# Patient Record
Sex: FEMALE | Race: WHITE | NOT HISPANIC OR LATINO | Employment: OTHER
[De-identification: names, ages, dates, MRNs, and addresses within clinical notes are randomized per-mention and may not be internally consistent; named-entity substitution may affect disease eponyms.]

---

## 2018-07-24 ENCOUNTER — TRANSCRIBE ORDERS (OUTPATIENT)
Dept: SCHEDULING | Age: 63
End: 2018-07-24

## 2018-07-24 DIAGNOSIS — Z12.31 ENCOUNTER FOR SCREENING MAMMOGRAM FOR MALIGNANT NEOPLASM OF BREAST: Primary | ICD-10-CM

## 2018-07-26 ENCOUNTER — HOSPITAL ENCOUNTER (OUTPATIENT)
Dept: RADIOLOGY | Age: 63
Discharge: HOME | End: 2018-07-26
Attending: OBSTETRICS & GYNECOLOGY
Payer: COMMERCIAL

## 2018-07-26 DIAGNOSIS — Z12.31 ENCOUNTER FOR SCREENING MAMMOGRAM FOR MALIGNANT NEOPLASM OF BREAST: ICD-10-CM

## 2018-07-26 PROCEDURE — 77063 BREAST TOMOSYNTHESIS BI: CPT

## 2019-03-27 ENCOUNTER — TRANSCRIBE ORDERS (OUTPATIENT)
Dept: SCHEDULING | Age: 64
End: 2019-03-27

## 2019-03-27 DIAGNOSIS — I42.9 CARDIOMYOPATHY (CMS/HCC): ICD-10-CM

## 2019-03-27 DIAGNOSIS — R94.31 ABNORMAL ELECTROCARDIOGRAM: Primary | ICD-10-CM

## 2019-04-02 ENCOUNTER — HOSPITAL ENCOUNTER (OUTPATIENT)
Dept: CARDIOLOGY | Facility: CLINIC | Age: 64
Discharge: HOME | End: 2019-04-02
Attending: INTERNAL MEDICINE
Payer: COMMERCIAL

## 2019-04-02 VITALS — HEIGHT: 64 IN | BODY MASS INDEX: 28.85 KG/M2 | WEIGHT: 169 LBS

## 2019-04-02 DIAGNOSIS — R94.31 ABNORMAL ELECTROCARDIOGRAM: ICD-10-CM

## 2019-04-02 DIAGNOSIS — I42.9 CARDIOMYOPATHY (CMS/HCC): ICD-10-CM

## 2019-04-02 LAB
AORTIC ROOT ANNULUS: 2.7 CM
BSA FOR ECHO PROCEDURE: 1.86 M2
E WAVE DECELERATION TIME: 218 MS
E/A RATIO: 1.2
E/E' RATIO: 11.4
E/LAT E' RATIO: 10.6
EST RIGHT VENT SYSTOLIC PRESSURE BY TRICUSPID REGURGITATION JET: 18 MMHG
FRACTIONAL SHORTENING: 35.9 %
INTERVENTRICULAR SEPTUM: 0.8 CM
LA/AORTA RATIO: 1.48
LAD 2D: 4 CM
LEFT INTERNAL DIMENSION IN SYSTOLE: 2.5 CM (ref 2.53–3.83)
LEFT VENTRICULAR INTERNAL DIMENSION IN DIASTOLE: 3.9 CM (ref 4.28–5.94)
LEFT VENTRICULAR POSTERIOR WALL IN END DIASTOLE: 0.9 CM (ref 0.56–1.05)
MV E'TISSUE VEL-LAT: 0.09 M/S
MV E'TISSUE VEL-MED: 0.09 M/S
MV PEAK A VEL: 0.83 M/S
MV PEAK E VEL: 0.97 M/S
POSTERIOR WALL: 0.9 CM
TR MAX PG: 13 MMHG
TRICUSPID VALVE PEAK REGURGITATION VELOCITY: 1.82 M/S
Z-SCORE OF LEFT VENTRICULAR DIMENSION IN END DIASTOLE: -2.57
Z-SCORE OF LEFT VENTRICULAR DIMENSION IN END SYSTOLE: -1.74
Z-SCORE OF LEFT VENTRICULAR POSTERIOR WALL IN END DIASTOLE: 0.85

## 2019-04-02 PROCEDURE — 93306 TTE W/DOPPLER COMPLETE: CPT

## 2019-05-10 ENCOUNTER — TRANSCRIBE ORDERS (OUTPATIENT)
Dept: SCHEDULING | Age: 64
End: 2019-05-10

## 2019-05-10 DIAGNOSIS — Z82.49 FAMILY HISTORY OF ISCHEMIC HEART DISEASE AND OTHER DISEASES OF THE CIRCULATORY SYSTEM: Primary | ICD-10-CM

## 2019-05-16 ENCOUNTER — HOSPITAL ENCOUNTER (OUTPATIENT)
Dept: RADIOLOGY | Facility: HOSPITAL | Age: 64
Discharge: HOME | End: 2019-05-16
Attending: INTERNAL MEDICINE

## 2019-05-16 DIAGNOSIS — Z82.49 FAMILY HISTORY OF ISCHEMIC HEART DISEASE AND OTHER DISEASES OF THE CIRCULATORY SYSTEM: ICD-10-CM

## 2019-05-16 PROCEDURE — 75571 CT HRT W/O DYE W/CA TEST: CPT

## 2019-09-27 ENCOUNTER — TRANSCRIBE ORDERS (OUTPATIENT)
Dept: SCHEDULING | Age: 64
End: 2019-09-27

## 2019-09-27 DIAGNOSIS — Z12.31 ENCOUNTER FOR SCREENING MAMMOGRAM FOR MALIGNANT NEOPLASM OF BREAST: Primary | ICD-10-CM

## 2019-10-02 ENCOUNTER — HOSPITAL ENCOUNTER (OUTPATIENT)
Dept: RADIOLOGY | Age: 64
Discharge: HOME | End: 2019-10-02
Attending: OBSTETRICS & GYNECOLOGY
Payer: COMMERCIAL

## 2019-10-02 DIAGNOSIS — Z12.31 ENCOUNTER FOR SCREENING MAMMOGRAM FOR MALIGNANT NEOPLASM OF BREAST: ICD-10-CM

## 2019-10-02 PROCEDURE — 77067 SCR MAMMO BI INCL CAD: CPT

## 2019-10-14 PROCEDURE — 88305 TISSUE EXAM BY PATHOLOGIST: CPT | Performed by: DERMATOLOGY

## 2019-10-18 ENCOUNTER — LAB REQUISITION (OUTPATIENT)
Dept: LAB | Facility: HOSPITAL | Age: 64
End: 2019-10-18
Attending: DERMATOLOGY
Payer: COMMERCIAL

## 2019-10-18 DIAGNOSIS — D48.5 NEOPLASM OF UNCERTAIN BEHAVIOR OF SKIN: ICD-10-CM

## 2019-10-21 LAB
CASE RPRT: NORMAL
CLINICAL INFO: NORMAL
PATH REPORT.FINAL DX SPEC: NORMAL
PATH REPORT.GROSS SPEC: NORMAL
PATH REPORT.MICROSCOPIC SPEC OTHER STN: NORMAL

## 2020-11-03 ENCOUNTER — TRANSCRIBE ORDERS (OUTPATIENT)
Dept: SCHEDULING | Age: 65
End: 2020-11-03

## 2020-11-03 DIAGNOSIS — Z12.31 ENCOUNTER FOR SCREENING MAMMOGRAM FOR MALIGNANT NEOPLASM OF BREAST: Primary | ICD-10-CM

## 2020-11-30 ENCOUNTER — HOSPITAL ENCOUNTER (OUTPATIENT)
Dept: RADIOLOGY | Age: 65
Discharge: HOME | End: 2020-11-30
Attending: INTERNAL MEDICINE
Payer: COMMERCIAL

## 2020-11-30 DIAGNOSIS — Z12.31 ENCOUNTER FOR SCREENING MAMMOGRAM FOR MALIGNANT NEOPLASM OF BREAST: ICD-10-CM

## 2020-11-30 PROCEDURE — 77067 SCR MAMMO BI INCL CAD: CPT

## 2021-04-14 DIAGNOSIS — Z23 ENCOUNTER FOR IMMUNIZATION: ICD-10-CM

## 2021-10-26 ENCOUNTER — TRANSCRIBE ORDERS (OUTPATIENT)
Dept: SCHEDULING | Age: 66
End: 2021-10-26
Payer: COMMERCIAL

## 2021-10-26 DIAGNOSIS — Z78.0 ASYMPTOMATIC MENOPAUSAL STATE: ICD-10-CM

## 2021-10-26 DIAGNOSIS — M85.50 ANEURYSMAL BONE CYST, UNSPECIFIED SITE: ICD-10-CM

## 2021-10-26 DIAGNOSIS — Z12.31 ENCOUNTER FOR SCREENING MAMMOGRAM FOR MALIGNANT NEOPLASM OF BREAST: Primary | ICD-10-CM

## 2022-01-14 ENCOUNTER — HOSPITAL ENCOUNTER (OUTPATIENT)
Dept: RADIOLOGY | Age: 67
Discharge: HOME | End: 2022-01-14
Attending: OBSTETRICS & GYNECOLOGY
Payer: COMMERCIAL

## 2022-01-14 DIAGNOSIS — Z78.0 ASYMPTOMATIC MENOPAUSAL STATE: ICD-10-CM

## 2022-01-14 DIAGNOSIS — Z12.31 ENCOUNTER FOR SCREENING MAMMOGRAM FOR MALIGNANT NEOPLASM OF BREAST: ICD-10-CM

## 2022-01-14 DIAGNOSIS — M85.50 ANEURYSMAL BONE CYST, UNSPECIFIED SITE: ICD-10-CM

## 2022-01-14 PROCEDURE — 77080 DXA BONE DENSITY AXIAL: CPT

## 2022-01-14 PROCEDURE — 77063 BREAST TOMOSYNTHESIS BI: CPT

## 2022-03-03 ENCOUNTER — TELEPHONE (OUTPATIENT)
Dept: PULMONOLOGY | Facility: HOSPITAL | Age: 67
End: 2022-03-03
Payer: COMMERCIAL

## 2022-03-03 ENCOUNTER — TRANSCRIBE ORDERS (OUTPATIENT)
Dept: SCHEDULING | Age: 67
End: 2022-03-03

## 2022-03-03 VITALS — HEIGHT: 62 IN | BODY MASS INDEX: 31.1 KG/M2 | WEIGHT: 169 LBS

## 2022-03-03 DIAGNOSIS — Z87.891 PERSONAL HISTORY OF TOBACCO USE, PRESENTING HAZARDS TO HEALTH: Primary | ICD-10-CM

## 2022-03-03 DIAGNOSIS — Z12.2 SCREENING FOR MALIGNANT NEOPLASM OF RESPIRATORY ORGAN: ICD-10-CM

## 2022-03-03 DIAGNOSIS — M41.9 SCOLIOSIS, UNSPECIFIED: Primary | ICD-10-CM

## 2022-03-03 NOTE — TELEPHONE ENCOUNTER
Rachael called to schedule her baseline lung screening.  She was interviewed and sent to scheduling.

## 2022-03-10 ENCOUNTER — HOSPITAL ENCOUNTER (OUTPATIENT)
Dept: RADIOLOGY | Age: 67
Discharge: HOME | End: 2022-03-10
Attending: INTERNAL MEDICINE
Payer: COMMERCIAL

## 2022-03-10 DIAGNOSIS — M41.9 SCOLIOSIS, UNSPECIFIED: ICD-10-CM

## 2022-03-11 ENCOUNTER — HOSPITAL ENCOUNTER (OUTPATIENT)
Dept: RADIOLOGY | Age: 67
Discharge: HOME | End: 2022-03-11
Attending: INTERNAL MEDICINE
Payer: COMMERCIAL

## 2022-03-11 DIAGNOSIS — Z12.2 SCREENING FOR MALIGNANT NEOPLASM OF RESPIRATORY ORGAN: ICD-10-CM

## 2022-03-11 DIAGNOSIS — Z87.891 PERSONAL HISTORY OF TOBACCO USE, PRESENTING HAZARDS TO HEALTH: ICD-10-CM

## 2022-03-11 PROCEDURE — G1004 CDSM NDSC: HCPCS

## 2023-02-10 ENCOUNTER — HOSPITAL ENCOUNTER (OUTPATIENT)
Dept: RADIOLOGY | Age: 68
Discharge: HOME | End: 2023-02-10
Attending: INTERNAL MEDICINE
Payer: COMMERCIAL

## 2023-02-10 ENCOUNTER — TRANSCRIBE ORDERS (OUTPATIENT)
Dept: RADIOLOGY | Age: 68
End: 2023-02-10

## 2023-02-10 DIAGNOSIS — Z12.31 ENCOUNTER FOR SCREENING MAMMOGRAM FOR MALIGNANT NEOPLASM OF BREAST: Primary | ICD-10-CM

## 2023-02-10 DIAGNOSIS — Z12.31 ENCOUNTER FOR SCREENING MAMMOGRAM FOR MALIGNANT NEOPLASM OF BREAST: ICD-10-CM

## 2023-02-10 PROCEDURE — 77063 BREAST TOMOSYNTHESIS BI: CPT

## 2023-05-11 ENCOUNTER — TRANSCRIBE ORDERS (OUTPATIENT)
Dept: SCHEDULING | Age: 68
End: 2023-05-11

## 2023-05-11 ENCOUNTER — TELEPHONE (OUTPATIENT)
Dept: PULMONOLOGY | Facility: HOSPITAL | Age: 68
End: 2023-05-11
Payer: COMMERCIAL

## 2023-05-11 VITALS — BODY MASS INDEX: 31.28 KG/M2 | HEIGHT: 62 IN | WEIGHT: 170 LBS

## 2023-05-11 DIAGNOSIS — Z87.891 FORMER SMOKER: ICD-10-CM

## 2023-05-11 DIAGNOSIS — R91.1 COIN LESION: ICD-10-CM

## 2023-05-11 DIAGNOSIS — Z12.2 SCREENING FOR MALIGNANT NEOPLASM OF RESPIRATORY ORGAN: Primary | ICD-10-CM

## 2023-05-20 ENCOUNTER — HOSPITAL ENCOUNTER (OUTPATIENT)
Facility: HOSPITAL | Age: 68
Setting detail: OBSERVATION
Discharge: HOME | DRG: 603 | End: 2023-05-22
Attending: EMERGENCY MEDICINE | Admitting: HOSPITALIST
Payer: COMMERCIAL

## 2023-05-20 ENCOUNTER — APPOINTMENT (EMERGENCY)
Dept: RADIOLOGY | Facility: HOSPITAL | Age: 68
DRG: 603 | End: 2023-05-20
Payer: COMMERCIAL
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
## 2023-05-20 DIAGNOSIS — R50.9 FEVER, UNSPECIFIED FEVER CAUSE: Primary | ICD-10-CM

## 2023-05-20 DIAGNOSIS — L03.119 CELLULITIS OF HAND: ICD-10-CM

## 2023-05-20 PROBLEM — F41.9 ANXIETY AND DEPRESSION: Status: ACTIVE | Noted: 2023-05-20

## 2023-05-20 PROBLEM — S61.459A: Status: ACTIVE | Noted: 2023-05-20

## 2023-05-20 PROBLEM — F32.A ANXIETY AND DEPRESSION: Status: ACTIVE | Noted: 2023-05-20

## 2023-05-20 LAB
ALBUMIN SERPL-MCNC: 3.9 G/DL (ref 3.4–5)
ALP SERPL-CCNC: 108 IU/L (ref 35–126)
ALT SERPL-CCNC: 20 IU/L (ref 11–54)
ANION GAP SERPL CALC-SCNC: 10 MEQ/L (ref 3–15)
AST SERPL-CCNC: 36 IU/L (ref 15–41)
BASOPHILS # BLD: 0.05 K/UL (ref 0.01–0.1)
BASOPHILS NFR BLD: 0.6 %
BILIRUB SERPL-MCNC: 1 MG/DL (ref 0.3–1.2)
BUN SERPL-MCNC: 23 MG/DL (ref 8–20)
CALCIUM SERPL-MCNC: 8.6 MG/DL (ref 8.9–10.3)
CHLORIDE SERPL-SCNC: 103 MEQ/L (ref 98–109)
CO2 SERPL-SCNC: 22 MEQ/L (ref 22–32)
CREAT SERPL-MCNC: 1.1 MG/DL (ref 0.6–1.1)
CRP SERPL-MCNC: <6 MG/L
DIFFERENTIAL METHOD BLD: ABNORMAL
EOSINOPHIL # BLD: 0.02 K/UL (ref 0.04–0.36)
EOSINOPHIL NFR BLD: 0.2 %
ERYTHROCYTE [DISTWIDTH] IN BLOOD BY AUTOMATED COUNT: 13.9 % (ref 11.7–14.4)
FLUAV RNA SPEC QL NAA+PROBE: NEGATIVE
FLUBV RNA SPEC QL NAA+PROBE: NEGATIVE
GFR SERPL CREATININE-BSD FRML MDRD: 49.5 ML/MIN/1.73M*2
GLUCOSE SERPL-MCNC: 179 MG/DL (ref 70–99)
HCT VFR BLDCO AUTO: 35.8 % (ref 35–45)
HGB BLD-MCNC: 12 G/DL (ref 11.8–15.7)
IMM GRANULOCYTES # BLD AUTO: 0.03 K/UL (ref 0–0.08)
IMM GRANULOCYTES NFR BLD AUTO: 0.4 %
LACTATE SERPL-SCNC: 1.9 MMOL/L (ref 0.4–2)
LYMPHOCYTES # BLD: 0.5 K/UL (ref 1.2–3.5)
LYMPHOCYTES NFR BLD: 6.1 %
MCH RBC QN AUTO: 30.6 PG (ref 28–33.2)
MCHC RBC AUTO-ENTMCNC: 33.5 G/DL (ref 32.2–35.5)
MCV RBC AUTO: 91.3 FL (ref 83–98)
MONOCYTES # BLD: 0.73 K/UL (ref 0.28–0.8)
MONOCYTES NFR BLD: 9 %
NEUTROPHILS # BLD: 6.82 K/UL (ref 1.7–7)
NEUTS SEG NFR BLD: 83.7 %
NRBC BLD-RTO: 0 %
PDW BLD AUTO: 12.3 FL (ref 9.4–12.3)
PLATELET # BLD AUTO: 171 K/UL (ref 150–369)
POTASSIUM SERPL-SCNC: 5 MEQ/L (ref 3.6–5.1)
PROT SERPL-MCNC: 7.1 G/DL (ref 6–8.2)
RBC # BLD AUTO: 3.92 M/UL (ref 3.93–5.22)
RSV RNA SPEC QL NAA+PROBE: NEGATIVE
SARS-COV-2 RNA RESP QL NAA+PROBE: NEGATIVE
SODIUM SERPL-SCNC: 135 MEQ/L (ref 136–144)
WBC # BLD AUTO: 8.15 K/UL (ref 3.8–10.5)

## 2023-05-20 PROCEDURE — 63700000 HC SELF-ADMINISTRABLE DRUG

## 2023-05-20 PROCEDURE — 25800000 HC PHARMACY IV SOLUTIONS: Performed by: PHYSICIAN ASSISTANT

## 2023-05-20 PROCEDURE — 80053 COMPREHEN METABOLIC PANEL: CPT | Performed by: EMERGENCY MEDICINE

## 2023-05-20 PROCEDURE — 86140 C-REACTIVE PROTEIN: CPT | Performed by: PHYSICIAN ASSISTANT

## 2023-05-20 PROCEDURE — 96365 THER/PROPH/DIAG IV INF INIT: CPT

## 2023-05-20 PROCEDURE — G0378 HOSPITAL OBSERVATION PER HR: HCPCS

## 2023-05-20 PROCEDURE — 96361 HYDRATE IV INFUSION ADD-ON: CPT

## 2023-05-20 PROCEDURE — 83605 ASSAY OF LACTIC ACID: CPT | Performed by: PHYSICIAN ASSISTANT

## 2023-05-20 PROCEDURE — 87637 SARSCOV2&INF A&B&RSV AMP PRB: CPT | Performed by: PHYSICIAN ASSISTANT

## 2023-05-20 PROCEDURE — 36415 COLL VENOUS BLD VENIPUNCTURE: CPT | Performed by: EMERGENCY MEDICINE

## 2023-05-20 PROCEDURE — 71046 X-RAY EXAM CHEST 2 VIEWS: CPT

## 2023-05-20 PROCEDURE — 93005 ELECTROCARDIOGRAM TRACING: CPT | Performed by: HOSPITALIST

## 2023-05-20 PROCEDURE — 71045 X-RAY EXAM CHEST 1 VIEW: CPT

## 2023-05-20 PROCEDURE — 73130 X-RAY EXAM OF HAND: CPT | Mod: 50

## 2023-05-20 PROCEDURE — 99223 1ST HOSP IP/OBS HIGH 75: CPT | Performed by: HOSPITALIST

## 2023-05-20 PROCEDURE — 63600000 HC DRUGS/DETAIL CODE: Performed by: PHYSICIAN ASSISTANT

## 2023-05-20 PROCEDURE — 87040 BLOOD CULTURE FOR BACTERIA: CPT | Performed by: PHYSICIAN ASSISTANT

## 2023-05-20 PROCEDURE — 63700000 HC SELF-ADMINISTRABLE DRUG: Performed by: STUDENT IN AN ORGANIZED HEALTH CARE EDUCATION/TRAINING PROGRAM

## 2023-05-20 PROCEDURE — 63700000 HC SELF-ADMINISTRABLE DRUG: Performed by: HOSPITALIST

## 2023-05-20 PROCEDURE — 99285 EMERGENCY DEPT VISIT HI MDM: CPT | Mod: 25

## 2023-05-20 PROCEDURE — 85025 COMPLETE CBC W/AUTO DIFF WBC: CPT | Performed by: EMERGENCY MEDICINE

## 2023-05-20 PROCEDURE — 63600000 HC DRUGS/DETAIL CODE: Performed by: HOSPITALIST

## 2023-05-20 RX ORDER — KETOROLAC TROMETHAMINE 15 MG/ML
15 INJECTION, SOLUTION INTRAMUSCULAR; INTRAVENOUS EVERY 6 HOURS PRN
Status: DISCONTINUED | OUTPATIENT
Start: 2023-05-20 | End: 2023-05-22 | Stop reason: HOSPADM

## 2023-05-20 RX ORDER — LORAZEPAM 1 MG/1
2 TABLET ORAL NIGHTLY
Status: DISCONTINUED | OUTPATIENT
Start: 2023-05-20 | End: 2023-05-22 | Stop reason: HOSPADM

## 2023-05-20 RX ORDER — DEXTROSE 50 % IN WATER (D50W) INTRAVENOUS SYRINGE
25 AS NEEDED
Status: DISCONTINUED | OUTPATIENT
Start: 2023-05-20 | End: 2023-05-22 | Stop reason: HOSPADM

## 2023-05-20 RX ORDER — DEXTROSE 40 %
15-30 GEL (GRAM) ORAL AS NEEDED
Status: DISCONTINUED | OUTPATIENT
Start: 2023-05-20 | End: 2023-05-22 | Stop reason: HOSPADM

## 2023-05-20 RX ORDER — LORAZEPAM 1 MG/1
1 TABLET ORAL 3 TIMES DAILY PRN
Status: DISCONTINUED | OUTPATIENT
Start: 2023-05-20 | End: 2023-05-21

## 2023-05-20 RX ORDER — VIT C/E/ZN/COPPR/LUTEIN/ZEAXAN 250MG-90MG
1000 CAPSULE ORAL DAILY
COMMUNITY

## 2023-05-20 RX ORDER — ACETAMINOPHEN 325 MG/1
650 TABLET ORAL EVERY 4 HOURS PRN
Status: DISCONTINUED | OUTPATIENT
Start: 2023-05-20 | End: 2023-05-22 | Stop reason: HOSPADM

## 2023-05-20 RX ORDER — CHOLECALCIFEROL (VITAMIN D3) 25 MCG
1000 TABLET ORAL DAILY
Status: DISCONTINUED | OUTPATIENT
Start: 2023-05-21 | End: 2023-05-22 | Stop reason: HOSPADM

## 2023-05-20 RX ORDER — IBUPROFEN 200 MG
16-32 TABLET ORAL AS NEEDED
Status: DISCONTINUED | OUTPATIENT
Start: 2023-05-20 | End: 2023-05-22 | Stop reason: HOSPADM

## 2023-05-20 RX ORDER — ENOXAPARIN SODIUM 100 MG/ML
40 INJECTION SUBCUTANEOUS
Status: DISCONTINUED | OUTPATIENT
Start: 2023-05-21 | End: 2023-05-22 | Stop reason: HOSPADM

## 2023-05-20 RX ORDER — FLUOXETINE HYDROCHLORIDE 20 MG/1
20 CAPSULE ORAL 3 TIMES DAILY
COMMUNITY
Start: 2023-03-01

## 2023-05-20 RX ORDER — ACETAMINOPHEN 325 MG/1
975 TABLET ORAL ONCE
Status: COMPLETED | OUTPATIENT
Start: 2023-05-20 | End: 2023-05-20

## 2023-05-20 RX ORDER — FLUOXETINE HYDROCHLORIDE 20 MG/1
20 CAPSULE ORAL 3 TIMES DAILY
Status: DISCONTINUED | OUTPATIENT
Start: 2023-05-20 | End: 2023-05-21

## 2023-05-20 RX ORDER — LORAZEPAM 1 MG/1
1 TABLET ORAL 3 TIMES DAILY PRN
COMMUNITY
Start: 2023-01-03

## 2023-05-20 RX ADMIN — AMPICILLIN SODIUM AND SULBACTAM SODIUM 3 G: 2; 1 INJECTION, POWDER, FOR SOLUTION INTRAMUSCULAR; INTRAVENOUS at 18:29

## 2023-05-20 RX ADMIN — FLUOXETINE 20 MG: 20 CAPSULE ORAL at 21:37

## 2023-05-20 RX ADMIN — KETOROLAC TROMETHAMINE 15 MG: 15 INJECTION, SOLUTION INTRAMUSCULAR; INTRAVENOUS at 21:13

## 2023-05-20 RX ADMIN — LORAZEPAM 2 MG: 1 TABLET ORAL at 21:37

## 2023-05-20 RX ADMIN — SODIUM CHLORIDE 1000 ML: 9 INJECTION, SOLUTION INTRAVENOUS at 18:29

## 2023-05-20 RX ADMIN — ACETAMINOPHEN 975 MG: 325 TABLET ORAL at 17:39

## 2023-05-20 ASSESSMENT — ENCOUNTER SYMPTOMS
SHORTNESS OF BREATH: 0
FEVER: 1
VOMITING: 0
DYSURIA: 0
COUGH: 1
ABDOMINAL PAIN: 0
FATIGUE: 1
CHILLS: 1
DIARRHEA: 0

## 2023-05-20 NOTE — ED ATTESTATION NOTE
"I have personally seen and examined the patient.  I personally performed the key components of the encounter and provided a substantive portion of the care and medical decision making for this patient.  I reviewed and agree with physician assistant / nurse practitioners assessment and plan of care, with the following exceptions: None  My examination, assessment, and plan of care of Rachael Louis is as follows:     Exam: Well-appearing, no acute distress, wake alert oriented x3, right hand with puncture wounds to the  thenar eminence and webspace between first and second digit, surrounding redness, warmth and swelling, good range of motion of fingers without restriction, no crepitus, good cap refill, normal pulses, left hand with puncture wounds to the left thenar eminence and forearm, mild redness and warmth to the hand, none forearm, no crepitus, normal pulses, good cap refill    Assessment and plan: Patient was bitten by her dog this morning and at 1 PM started with fevers and chills worsening pain and redness to her hands, patient had similar presentation about 10 years ago after dog bite in which she had rapidly progressing infection due to Pasteurella and required hospitalization and needed an I&D, no infectious symptoms leading up to this, just \"allergy\" symptoms she is nontoxic in appearance, lab work reassuring, no signs of necrotizing fasciitis, given IV fluids, IV antibiotics, will hospitalize for further evaluation and treatment    Differential diagnoses considered but not limited to, include: Cellulitis, bacteremia, necrotizing fasciitis, viral syndrome, sepsis         Linus Edward,   05/20/23 1951    "

## 2023-05-20 NOTE — ED PROVIDER NOTES
Emergency Medicine Note  HPI   HISTORY OF PRESENT ILLNESS     66 y/o F with PMH depression, seasonal allergies presents today for evaluation. Pt states at 9 am was attempting to feed her dog chicken and bite patient to bilateral hands resulting in multiple lacerations. 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 At 1 pm started with subjective fevers and chills. Pt checked temperature and found to have fever. Pt states has h/o prior animal bite to finger with similar reaction of fever a few hours after injury.  Pt was admitted for a week and required OR for wash out. Pt also admits to mild cough and congestion typical of her seasonal allergies. Denies vomiting, diarrhea or urinary symptoms.      History provided by:  Patient  Animal Bite  Associated symptoms: fever          Patient History   PAST HISTORY     Reviewed from Nursing Triage:       Past Medical History:   Diagnosis Date    Depression     Seasonal allergies        Past Surgical History:   Procedure Laterality Date    NEPHRECTOMY         Family History   Problem Relation Age of Onset    Lung cancer Neg Hx        Social History     Tobacco Use    Smoking status: Former     Packs/day: 0.75     Years: 36.00     Pack years: 27.00     Types: Cigarettes     Start date:      Quit date:      Years since quittin.3     Passive exposure: Past    Smokeless tobacco: Never    Tobacco comments:     quit for about 5 yrs during span   Substance Use Topics    Alcohol use: Not Currently    Drug use: Never         Review of Systems   REVIEW OF SYSTEMS     Review of Systems   Constitutional: Positive for chills, fatigue and fever.   HENT: Positive for congestion.    Respiratory: Positive for cough (dry). Negative for shortness of breath.    Cardiovascular: Negative for chest pain.   Gastrointestinal: Negative for abdominal pain, diarrhea and vomiting.   Genitourinary: Negative for dysuria.   Neurological: Negative for syncope.         VITALS     ED  Vitals    Date/Time Temp Pulse Resp BP SpO2 [facility]   05/20/23 1926 38.4 °C (101.2 °F) 81 16 116/59 98 % MG   05/20/23 1732 38.4 °C (101.2 °F) 90 18 139/64 97 % TS        Pulse Ox %: 97 % (05/20/23 1752)  Pulse Ox Interpretation: Normal (05/20/23 1752)           Physical Exam   PHYSICAL EXAM     Physical Exam  Vitals and nursing note reviewed.   Constitutional:       Appearance: Normal appearance.   HENT:      Head: Normocephalic.   Cardiovascular:      Rate and Rhythm: Normal rate and regular rhythm.   Pulmonary:      Effort: Pulmonary effort is normal.      Breath sounds: Normal breath sounds.   Abdominal:      Palpations: Abdomen is soft.      Tenderness: There is no abdominal tenderness.   Musculoskeletal:      Cervical back: Normal range of motion and neck supple.      Comments: Pt with multiple puncture wounds to bilateral dorsal hands. 2 puncture wounds slightly deeper to L crease of 1st and 2nd digits. Bleeding controlled. Mild surrounding erythema, edema and warmth to puncture wounds without fluctuance, crepitus or drainage. FROM digits, hand, wrist. 2+ radial pulse    Skin:     General: Skin is warm and dry.   Neurological:      General: No focal deficit present.      Mental Status: She is alert and oriented to person, place, and time.           PROCEDURES     Procedures     DATA     Results     Procedure Component Value Units Date/Time    CBC and differential [[id number]]  (Abnormal) Collected: 05/20/23 1844    Specimen: Blood, Venous Updated: 05/20/23 1851     WBC 8.15 K/uL      RBC 3.92 M/uL      Hemoglobin 12.0 g/dL      Hematocrit 35.8 %      MCV 91.3 fL      MCH 30.6 pg      MCHC 33.5 g/dL      RDW 13.9 %      Platelets 171 K/uL      MPV 12.3 fL      Differential Type Auto     nRBC 0.0 %      Immature Granulocytes 0.4 %      Neutrophils 83.7 %      Lymphocytes 6.1 %      Monocytes 9.0 %      Eosinophils 0.2 %      Basophils 0.6 %      Immature Granulocytes, Absolute 0.03 K/uL      Neutrophils, Absolute  6.82 K/uL      Lymphocytes, Absolute 0.50 K/uL      Monocytes, Absolute 0.73 K/uL      Eosinophils, Absolute 0.02 K/uL      Basophils, Absolute 0.05 K/uL     SARS-CoV-2 (COVID-19), PCR Nasopharynx [[id number]]  (Normal) Collected: 05/20/23 1758    Specimen: Nasopharyngeal Swab from Nasopharynx Updated: 05/20/23 1840    Narrative:      The following orders were created for panel order SARS-CoV-2 (COVID-19), PCR Nasopharynx.  Procedure                               Abnormality         Status                     ---------                               -----------         ------                     SARS-COV-2 (COVID-19)/ F...[[id number]]  Normal              Final result                 Please view results for these tests on the individual orders.    SARS-COV-2 (COVID-19)/ FLU A/B, AND RSV, PCR Nasopharynx [[id number]]  (Normal) Collected: 05/20/23 1758    Specimen: Nasopharyngeal Swab from Nasopharynx Updated: 05/20/23 1840     SARS-CoV-2 (COVID-19) Negative     Influenza A Negative     Influenza B Negative     Respiratory Syncytial Virus Negative    Narrative:      Testing performed using real-time PCR for detection of COVID-19. EUA approved validation studies performed on site.     C-reactive protein [[id number]]  (Normal) Collected: 05/20/23 1745    Specimen: Blood, Venous Updated: 05/20/23 1827     CRP <6.00 mg/L     Comprehensive metabolic panel [[id number]]  (Abnormal) Collected: 05/20/23 1745    Specimen: Blood, Venous Updated: 05/20/23 1827     Sodium 135 mEQ/L      Potassium 5.0 mEQ/L      Comment: Results obtained on plasma. Plasma Potassium values may be up to 0.4 mEQ/L less than serum values. The differences may be greater for patients with high platelet or white cell counts.  MODERATE HEMOLYSIS, RESULT MAY BE INCREASED.        Chloride 103 mEQ/L      CO2 22 mEQ/L      BUN 23 mg/dL      Creatinine 1.1 mg/dL      Glucose 179 mg/dL      Calcium 8.6 mg/dL      AST (SGOT) 36 IU/L      Comment: MODERATE HEMOLYSIS,  RESULT MAY BE INCREASED.        ALT (SGPT) 20 IU/L      Comment: MODERATE HEMOLYSIS, RESULT MAY BE INCREASED.        Alkaline Phosphatase 108 IU/L      Total Protein 7.1 g/dL      Comment: Test performed on plasma which typically contains approximately 0.4 g/dL more protein than serum.        Albumin 3.9 g/dL      Bilirubin, Total 1.0 mg/dL      Comment: MODERATE HEMOLYSIS, RESULT MAY BE INCREASED.        eGFR 49.5 mL/min/1.73m*2      Anion Gap 10 mEQ/L     Lactate, w/ reflex repeat if > 2.0 [[id number]]  (Normal) Collected: 05/20/23 1752    Specimen: Blood, Venous Updated: 05/20/23 1801     Lactate 1.9 mmol/L     [place] Draw Panel [[id number]] Collected: 05/20/23 1754    Specimen: Blood, Venous Updated: 05/20/23 1758    Narrative:      The following orders were created for panel order [place] Draw Panel.  Procedure                               Abnormality         Status                     ---------                               -----------         ------                     [place] Blood Culture[[id number]]                            In process                   Please view results for these tests on the individual orders.    [place] Blood Culture [[id number]] Collected: 05/20/23 1754    Specimen: Blood, Venous Updated: 05/20/23 1758    [place] Draw Panel [[id number]] Collected: 05/20/23 1745    Specimen: Blood, Venous Updated: 05/20/23 1751    Narrative:      The following orders were created for panel order [place] Draw Panel.  Procedure                               Abnormality         Status                     ---------                               -----------         ------                     [place] Blood Culture[[id number]]                            In process                   Please view results for these tests on the individual orders.    [place] Blood Culture [[id number]] Collected: 05/20/23 1745    Specimen: Blood, Venous Updated: 05/20/23 1751          Imaging Results          X-RAY HAND BILATERAL 3+VW  (Preliminary result)  Result time 05/20/23 19:13:11    Preliminary Interpretation    NAD                             X-RAY CHEST 1 VIEW (Preliminary result)  Result time 05/20/23 19:13:15   Procedure changed from X-RAY CHEST 2 VIEWS     Preliminary Interpretation    NAD                              No orders to display       Scoring tools                                  ED Course & MDM   MDM / ED COURSE / CLINICAL IMPRESSION / DISPO     MDM    ED Course as of 05/20/23 2008   Sat May 20, 2023   1804 Pt presents today for evaluation of fever in setting of dog bite to b/l hands earlier today. Pt febrile to 101.2 F but non toxic appearing. Pt with evidence of mild cellulitis to b/l hands. IV Unasyn ordered. Labs, x-ray pending. Will add COVID swab, UA and CXR to r/u other potential etiology of fever  [NH]   1913 Labs reassuring  COVID negative  CXR clear  X-ray hands unremarkable  Given how rapidly fever developed after dog bite with history,  plan for admission for IV antibiotics  Pt agrees with plan  Paged HMS [NH]      ED Course User Index  [NH] Astrid Joshi PA C     Clinical Impression      Fever, unspecified fever cause   Dog bite, initial encounter   Cellulitis of hand     _________________     ED Disposition   Admit / Observation                   Astrid Joshi PA C  05/20/23 2008

## 2023-05-20 NOTE — H&P
"   Hospital Medicine Service -  History & Physical        CHIEF COMPLAINT   \"Dog bite\"     HISTORY OF PRESENT ILLNESS      Rachael Louis is a 67 y.o. female with a past medical history of Depression, Seasonal Allergies ad prior history of dog bite with abscess and cellulitis due to Pasteurella [place] presents with fever, chills, nausea and generalized malaise that started around 1 pm today. Patient reports that her dog has not been feeling well and this morning she was helping him eat a chicken bone when he suddenly became upset and started to \"bite at her\". She states that he initially bit her left hand and when she tried to use her right hand to push him away from her he then proceeded to bite her right hand. She states that this occurred at 9 AM this morning and that she sustained several puncture wounds affecting bilateral hands and the left wrist. She states that she cleaned the wounds out with water and betadine to limit the possibility of infection. As the day progressed she states that she started to have swelling and erythema localized around the puncture wounds, however, she noticed that by the afternoon that the areas of swelling and tenderness became worse. She states that her pain is localized to the areas of the puncture wounds and when she tries to make a fist with her hand. She denies any lightheadedness, dizziness, cough, sore throat, chest pain, palpitations, shortness of breath, pain with deep respirations, abdominal pain, dysuria, diarrhea, constipation, nausea or vomiting. On arrival to the ER the patient was noted to be febrile with a temperature of 101.2. She has been given a dose of IV Unasyn 3 grams in the ER. Patient reports that she had a dog bite approximately 10 years ago that was not controlled and required her to have to go to the OR for washout and required IV antibiotics.     PAST MEDICAL AND SURGICAL HISTORY      Past Medical History:   Diagnosis Date    Depression     " Seasonal allergies        Past Surgical History:   Procedure Laterality Date    CARPAL TUNNEL RELEASE Bilateral     HAND SURGERY      [place]    NEPHRECTOMY         PCP: Primo Rodrigez MD    MEDICATIONS      No current facility-administered medications on file prior to encounter.     Current Outpatient Medications on File Prior to Encounter   Medication Sig Dispense Refill    FLUoxetine (PROzac) 20 mg capsule Take 20 mg by mouth 3 times daily.      LORazepam (ATIVAN) 1 mg tablet Take 1 mg by mouth 3 (three) times a day as needed for anxiety.      cholecalciferol, vitamin D3, 25 mcg (1,000 unit) capsule Take 1,000 Units by mouth daily.           ALLERGIES      Bactrim [sulfamethoxazole-trimethoprim]    FAMILY HISTORY      Family History   Problem Relation Age of Onset    Lung cancer Neg Hx        SOCIAL HISTORY      Social History     Socioeconomic History    Marital status:      Spouse name: None    Number of children: None    Years of education: None    Highest education level: None   Tobacco Use    Smoking status: Former     Packs/day: 0.75     Years: 36.00     Pack years: 27.00     Types: Cigarettes     Start date:      Quit date:      Years since quittin.3     Passive exposure: Past    Smokeless tobacco: Never    Tobacco comments:     quit for about 5 yrs during span   Substance and Sexual Activity    Alcohol use: Not Currently    Drug use: Never   Social History Narrative    Retired hospice nurse.     Social Determinants of Health     Food Insecurity: No Food Insecurity (2023)    Hunger Vital Sign     Worried About Running Out of Food in the Last Year: Never true     Ran Out of Food in the Last Year: Never true       REVIEW OF SYSTEMS      All other systems reviewed and negative except as noted in HPI    PHYSICAL EXAMINATION      Temp:  [38.4 °C (101.2 °F)] 38.4 °C (101.2 °F)  Heart Rate:  [81-90] 81  Resp:  [16-18] 16  BP: (116-139)/(59-64) 116/59  Body mass index  is 31.09 kg/m².    Physical Exam  Vitals and nursing note reviewed.   Constitutional:       General: She is not in acute distress.     Appearance: She is not ill-appearing, toxic-appearing or diaphoretic.   HENT:      Head: Normocephalic and atraumatic.      Right Ear: External ear normal. There is no impacted cerumen.      Left Ear: External ear normal. There is no impacted cerumen.      Nose: Nose normal. No congestion or rhinorrhea.      Mouth/Throat:      Mouth: Mucous membranes are moist.      Pharynx: Oropharynx is clear. No oropharyngeal exudate or posterior oropharyngeal erythema.   Eyes:      General: No scleral icterus.     Conjunctiva/sclera: Conjunctivae normal.      Pupils: Pupils are equal, round, and reactive to light.   Cardiovascular:      Rate and Rhythm: Normal rate and regular rhythm.      Heart sounds: No murmur heard.  Pulmonary:      Effort: Pulmonary effort is normal. No respiratory distress.      Breath sounds: Normal breath sounds. No wheezing, rhonchi or rales.   Abdominal:      General: Bowel sounds are normal. There is no distension.      Palpations: Abdomen is soft.      Tenderness: There is no abdominal tenderness. There is no guarding or rebound.   Musculoskeletal:      Cervical back: Normal range of motion and neck supple. No rigidity.      Right lower leg: No edema.      Left lower leg: No edema.      Comments: Bilateral hands with puncture wounds noted overlying the 1st MCP joints and puncture wound overlying the left wrist, surround by erythema and swelling extending over the dorsum of the hands, tender to palpation, no discharge from the puncture wounds, limited range of movement at the MCP joints   Lymphadenopathy:      Cervical: No cervical adenopathy.   Skin:     Findings: Erythema and wound present.             Comments: Puncture wounds of bilateral hands and the left wrist   Neurological:      General: No focal deficit present.      Mental Status: She is alert and oriented  to person, place, and time. Mental status is at baseline.   Psychiatric:         Mood and Affect: Mood normal.         Behavior: Behavior normal.         LABS / IMAGING / EKG        Labs  I have reviewed the patient's pertinent labs.  Significant abnormals are Na 135, BUN 23, Glucose 179.  Lab Results   Component Value Date    GLUCOSE 179 (H) 05/20/2023    CALCIUM 8.6 (L) 05/20/2023     (L) 05/20/2023    K 5.0 05/20/2023    CO2 22 05/20/2023     05/20/2023    BUN 23 (H) 05/20/2023    CREATININE 1.1 05/20/2023     Lab Results   Component Value Date    ALT 20 05/20/2023    AST 36 05/20/2023    ALKPHOS 108 05/20/2023    BILITOT 1.0 05/20/2023     Lab Results   Component Value Date    WBC 8.15 05/20/2023    HGB 12.0 05/20/2023    HCT 35.8 05/20/2023    MCV 91.3 05/20/2023     05/20/2023       Imaging  I have independently reviewed the pertinent imaging from the last 24 hrs.  Bilateral Hand Xray- mild soft tissue swelling  CXR: No acute cardiopulmonary process.     SARS-CoV-2 (COVID-19) (no units)   Date/Time Value   05/20/2023 1758 Negative       ASSESSMENT AND PLAN           * Dog bite, hand, unspecified laterality, initial encounter  Assessment & Plan  Assessment: Multiple puncture wounds due to dog bite with resultant significant erythema, swelling and induration affecting the area. Concern for infection related to the dog bit and since it is affecting bilateral hands will have Orthopedic Surgery evaluate the patient for further recommendations.     Plan:  Will admit and monitor patient closely.   Continue IV Unasyn 3 g q8h.  Blood cultures pending.   Apply ice to the affected area as tolerated.  Wound cultures to be obtained if the area is draining.   Tylenol 650 mg q4h PRN pain.  IV Toradol 15 mg q6h PRN pain and inflammation.   Hand Surgery consult for further evaluation and recommendations.   Wound care to the affected areas of the hands and wrist.  Monitor fever curve closely.     Anxiety and  depression  Assessment & Plan  Plan:  Continue fluoxetine 60 mg daily.  Continue lorazepam 1 mg TID PRN anxiety.        VTE Assessment: [place] VTE Score: 4  VTE Prophylaxis: Current anticoagulants:  [START ON 5/21/2023] enoxaparin (LOVENOX) syringe 40 mg, subcutaneous, Daily (6p)      Code Status: Full Code  Palliative Care Screening Score: 0   Discussed advanced care planning.Patient is a FULL CODE. Her daughter, Ryanne Thomas, is her health care representative.  Extended Emergency Contact Information  Primary Emergency Contact: ryanne thomas  Mobile Phone: [phone]  Relation: Daughter  Preferred language: English   needed? No  Estimated Discharge Date: 5/21/2023  Disposition Planning:Anticipate discharge to home when medically stable.   >75 minutes spent on review of records and labs, physical exam of the patient and assessment/plan formulation.      Kitty Vallecillo MD  5/20/2023

## 2023-05-21 PROBLEM — R50.9 FEVER, UNSPECIFIED FEVER CAUSE: Status: ACTIVE | Noted: 2023-05-21

## 2023-05-21 LAB
ANION GAP SERPL CALC-SCNC: 7 MEQ/L (ref 3–15)
ATRIAL RATE: 69
BACTERIA URNS QL MICRO: ABNORMAL /HPF
BASOPHILS # BLD: 0.04 K/UL (ref 0.01–0.1)
BASOPHILS NFR BLD: 0.7 %
BILIRUB UR QL STRIP.AUTO: NEGATIVE MG/DL
BUN SERPL-MCNC: 23 MG/DL (ref 8–20)
CALCIUM SERPL-MCNC: 8.3 MG/DL (ref 8.9–10.3)
CHLORIDE SERPL-SCNC: 110 MEQ/L (ref 98–109)
CLARITY UR REFRACT.AUTO: CLEAR
CO2 SERPL-SCNC: 22 MEQ/L (ref 22–32)
COLOR UR AUTO: YELLOW
CREAT SERPL-MCNC: 1 MG/DL (ref 0.6–1.1)
DIFFERENTIAL METHOD BLD: ABNORMAL
EOSINOPHIL # BLD: 0.02 K/UL (ref 0.04–0.36)
EOSINOPHIL NFR BLD: 0.4 %
ERYTHROCYTE [DISTWIDTH] IN BLOOD BY AUTOMATED COUNT: 14.1 % (ref 11.7–14.4)
GFR SERPL CREATININE-BSD FRML MDRD: 55.3 ML/MIN/1.73M*2
GLUCOSE SERPL-MCNC: 98 MG/DL (ref 70–99)
GLUCOSE UR STRIP.AUTO-MCNC: NEGATIVE MG/DL
HCT VFR BLDCO AUTO: 35.5 % (ref 35–45)
HGB BLD-MCNC: 11.9 G/DL (ref 11.8–15.7)
HGB UR QL STRIP.AUTO: NEGATIVE
HYALINE CASTS #/AREA URNS LPF: ABNORMAL /LPF
IMM GRANULOCYTES # BLD AUTO: 0.01 K/UL (ref 0–0.08)
IMM GRANULOCYTES NFR BLD AUTO: 0.2 %
KETONES UR STRIP.AUTO-MCNC: NEGATIVE MG/DL
LEUKOCYTE ESTERASE UR QL STRIP.AUTO: NEGATIVE
LYMPHOCYTES # BLD: 1.44 K/UL (ref 1.2–3.5)
LYMPHOCYTES NFR BLD: 26.5 %
MAGNESIUM SERPL-MCNC: 2.1 MG/DL (ref 1.8–2.5)
MCH RBC QN AUTO: 30.7 PG (ref 28–33.2)
MCHC RBC AUTO-ENTMCNC: 33.5 G/DL (ref 32.2–35.5)
MCV RBC AUTO: 91.7 FL (ref 83–98)
MONOCYTES # BLD: 0.73 K/UL (ref 0.28–0.8)
MONOCYTES NFR BLD: 13.4 %
MUCOUS THREADS URNS QL MICRO: ABNORMAL /LPF
NEUTROPHILS # BLD: 3.2 K/UL (ref 1.7–7)
NEUTS SEG NFR BLD: 58.8 %
NITRITE UR QL STRIP.AUTO: NEGATIVE
NRBC BLD-RTO: 0 %
P AXIS: 35
PDW BLD AUTO: 12.2 FL (ref 9.4–12.3)
PH UR STRIP.AUTO: 6 [PH]
PLATELET # BLD AUTO: 155 K/UL (ref 150–369)
POTASSIUM SERPL-SCNC: 4.2 MEQ/L (ref 3.6–5.1)
PR INTERVAL: 132
PROT UR QL STRIP.AUTO: ABNORMAL
QRS DURATION: 74
QT INTERVAL: 418
QTC CALCULATION(BAZETT): 447
R AXIS: 22
RBC # BLD AUTO: 3.87 M/UL (ref 3.93–5.22)
RBC #/AREA URNS HPF: ABNORMAL /HPF
SODIUM SERPL-SCNC: 139 MEQ/L (ref 136–144)
SP GR UR REFRACT.AUTO: 1.03
SQUAMOUS URNS QL MICRO: ABNORMAL /HPF
T WAVE AXIS: 30
UROBILINOGEN UR STRIP-ACNC: 0.2 EU/DL
VENTRICULAR RATE: 69
WBC # BLD AUTO: 5.44 K/UL (ref 3.8–10.5)
WBC #/AREA URNS HPF: ABNORMAL /HPF

## 2023-05-21 PROCEDURE — 96366 THER/PROPH/DIAG IV INF ADDON: CPT | Performed by: EMERGENCY MEDICINE

## 2023-05-21 PROCEDURE — 63600000 HC DRUGS/DETAIL CODE: Performed by: HOSPITALIST

## 2023-05-21 PROCEDURE — G0378 HOSPITAL OBSERVATION PER HR: HCPCS

## 2023-05-21 PROCEDURE — 63700000 HC SELF-ADMINISTRABLE DRUG: Performed by: STUDENT IN AN ORGANIZED HEALTH CARE EDUCATION/TRAINING PROGRAM

## 2023-05-21 PROCEDURE — 63700000 HC SELF-ADMINISTRABLE DRUG: Performed by: HOSPITALIST

## 2023-05-21 PROCEDURE — 80048 BASIC METABOLIC PNL TOTAL CA: CPT | Performed by: HOSPITALIST

## 2023-05-21 PROCEDURE — 85025 COMPLETE CBC W/AUTO DIFF WBC: CPT | Performed by: HOSPITALIST

## 2023-05-21 PROCEDURE — 99231 SBSQ HOSP IP/OBS SF/LOW 25: CPT | Performed by: HOSPITALIST

## 2023-05-21 PROCEDURE — 36415 COLL VENOUS BLD VENIPUNCTURE: CPT | Performed by: HOSPITALIST

## 2023-05-21 PROCEDURE — 81001 URINALYSIS AUTO W/SCOPE: CPT | Performed by: PHYSICIAN ASSISTANT

## 2023-05-21 PROCEDURE — 83735 ASSAY OF MAGNESIUM: CPT | Performed by: HOSPITALIST

## 2023-05-21 PROCEDURE — 12000000 HC ROOM AND CARE MED/SURG

## 2023-05-21 PROCEDURE — 25800000 HC PHARMACY IV SOLUTIONS: Performed by: HOSPITALIST

## 2023-05-21 PROCEDURE — 96375 TX/PRO/DX INJ NEW DRUG ADDON: CPT | Performed by: EMERGENCY MEDICINE

## 2023-05-21 PROCEDURE — 93005 ELECTROCARDIOGRAM TRACING: CPT | Performed by: HOSPITALIST

## 2023-05-21 RX ORDER — FLUOXETINE HYDROCHLORIDE 20 MG/1
60 CAPSULE ORAL
Status: DISCONTINUED | OUTPATIENT
Start: 2023-05-22 | End: 2023-05-21

## 2023-05-21 RX ORDER — FAMOTIDINE 20 MG/1
20 TABLET, FILM COATED ORAL DAILY
Status: DISCONTINUED | OUTPATIENT
Start: 2023-05-21 | End: 2023-05-22 | Stop reason: HOSPADM

## 2023-05-21 RX ORDER — FLUOXETINE HYDROCHLORIDE 20 MG/1
60 CAPSULE ORAL
Status: DISCONTINUED | OUTPATIENT
Start: 2023-05-21 | End: 2023-05-22 | Stop reason: HOSPADM

## 2023-05-21 RX ADMIN — AMPICILLIN SODIUM AND SULBACTAM SODIUM 3 G: 2; 1 INJECTION, POWDER, FOR SOLUTION INTRAMUSCULAR; INTRAVENOUS at 12:24

## 2023-05-21 RX ADMIN — FLUOXETINE 60 MG: 20 CAPSULE ORAL at 18:05

## 2023-05-21 RX ADMIN — ENOXAPARIN SODIUM 40 MG: 40 INJECTION SUBCUTANEOUS at 18:05

## 2023-05-21 RX ADMIN — AMPICILLIN SODIUM AND SULBACTAM SODIUM 3 G: 2; 1 INJECTION, POWDER, FOR SOLUTION INTRAMUSCULAR; INTRAVENOUS at 01:01

## 2023-05-21 RX ADMIN — ACETAMINOPHEN 650 MG: 325 TABLET ORAL at 22:01

## 2023-05-21 RX ADMIN — AMPICILLIN SODIUM AND SULBACTAM SODIUM 3 G: 2; 1 INJECTION, POWDER, FOR SOLUTION INTRAMUSCULAR; INTRAVENOUS at 18:05

## 2023-05-21 RX ADMIN — Medication 1000 UNITS: at 08:03

## 2023-05-21 RX ADMIN — AMPICILLIN SODIUM AND SULBACTAM SODIUM 3 G: 2; 1 INJECTION, POWDER, FOR SOLUTION INTRAMUSCULAR; INTRAVENOUS at 06:22

## 2023-05-21 RX ADMIN — KETOROLAC TROMETHAMINE 15 MG: 15 INJECTION, SOLUTION INTRAMUSCULAR; INTRAVENOUS at 08:06

## 2023-05-21 RX ADMIN — ACETAMINOPHEN 650 MG: 325 TABLET ORAL at 12:23

## 2023-05-21 RX ADMIN — FAMOTIDINE 20 MG: 20 TABLET, FILM COATED ORAL at 13:57

## 2023-05-21 RX ADMIN — LORAZEPAM 2 MG: 1 TABLET ORAL at 22:01

## 2023-05-21 NOTE — PROGRESS NOTES
Hospital Medicine Service -  Daily Progress Note       SUBJECTIVE   Interval History: feeling better.     OBJECTIVE      Vital signs in last 24 hours:  Temp:  [36.5 °C (97.7 °F)-38.4 °C (101.2 °F)] 36.7 °C (98 °F)  Heart Rate:  [63-90] 63  Resp:  [16-18] 18  BP: (111-139)/(52-64) 116/58    Intake/Output Summary (Last 24 hours) at 5/21/2023 1728  Last data filed at 5/20/2023 1920  Gross per 24 hour   Intake 1100 ml   Output --   Net 1100 ml       PHYSICAL EXAMINATION      Physical Exam   Vitals:    05/21/23 1500   BP: (!) 116/58   Pulse: 63   Resp: 18   Temp: 36.7 °C (98 °F)   SpO2: 97%       Erythema appears improving  Multiple puncture wound to dorsal aspect of bilateral hands  Sensation intact    LABS / IMAGING / TELE      Labs  CBC   CBC Results       05/21/23 05/20/23     0358 1844    WBC 5.44 8.15    RBC 3.87 3.92    HGB 11.9 12.0    HCT 35.5 35.8    MCV 91.7 91.3    MCH 30.7 30.6    MCHC 33.5 33.5     171         BMP or CMP   BMP Results       05/21/23 05/20/23 05/18/15     0358 1745 1720     135 --    K 4.2 5.0 --    Cl 110 103 --    CO2 22 22 --    Glucose 98 179 --    BUN 23 23 19    Creatinine 1.0 1.1 1.1    Calcium 8.3 8.6 --    Anion Gap 7 10 --    EGFR 55.3 49.5 --         Comment for K at 1745 on 05/20/23: Results obtained on plasma. Plasma Potassium values may be up to 0.4 mEQ/L less than serum values. The differences may be greater for patients with high platelet or white cell counts.  MODERATE HEMOLYSIS, RESULT MAY BE INCREASED.        CMP Results       05/21/23 05/20/23 05/18/15     0358 1745 1720     135 --    K 4.2 5.0 --    Cl 110 103 --    CO2 22 22 --    Glucose 98 179 --    BUN 23 23 19    Creatinine 1.0 1.1 1.1    Calcium 8.3 8.6 --    Anion Gap 7 10 --    AST -- 36 --    ALT -- 20 --    Albumin -- 3.9 --    EGFR 55.3 49.5 --         Comment for K at 1745 on 05/20/23: Results obtained on plasma. Plasma Potassium values may be up to 0.4 mEQ/L less than serum values. The  differences may be greater for patients with high platelet or white cell counts.  MODERATE HEMOLYSIS, RESULT MAY BE INCREASED.    Comment for AST at 1745 on 05/20/23: MODERATE HEMOLYSIS, RESULT MAY BE INCREASED.    Comment for ALT at 1745 on 05/20/23: MODERATE HEMOLYSIS, RESULT MAY BE INCREASED.           INR          Imaging  X-RAY HAND BILATERAL 3+VW    Result Date: 5/21/2023  CLINICAL HISTORY: Dog bite. COMMENT: Four views of the right hand and four views of the left hand are obtained. Mild demineralization.  No focal bone lesion.  No fracture or dislocation. 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  No osteolysis to suggest osteomyelitis.  No air or radiopaque foreign bodies in the soft tissues.     IMPRESSION: No evidence for osteomyelitis or radiopaque foreign bodies in the soft tissues.    X-RAY CHEST 1 VIEW    Result Date: 5/20/2023  CLINICAL HISTORY: Chest pain COMMENT: A single AP view of the chest was obtained and compared to prior study from 2022. There is no focal consolidation, pleural effusion, or pneumothorax.  The heart is normal in size.  There are degenerative changes in the shoulders.  There are calcifications adjacent to the right humeral head which can be seen with calcific tendinopathy.     IMPRESSION: No acute disease in the chest.        ECG/Telemetry  NA    ASSESSMENT AND PLAN      * Dog bite, hand, unspecified laterality, initial encounter  Assessment & Plan  Assessment: Multiple puncture wounds due to dog bite with resultant significant erythema, swelling and induration affecting the area. Concern for infection related to the dog bit and since it is affecting bilateral hands will have Orthopedic Surgery evaluate the patient for further recommendations.     Plan:   Continue IV Unasyn 3 g q8h.  Blood cultures pending.   Apply ice to the affected area as tolerated.  Wound cultures to be obtained if the area is draining.   Tylenol 650 mg q4h PRN pain.  IV Toradol  15 mg q6h PRN pain and inflammation.   Hand Surgery consult for further evaluation and recommendations- with thanks!  Wound care to the affected areas of the hands and wrist.  Monitor fever curve closely.     Anxiety and depression  Assessment & Plan  Plan:  Continue fluoxetine 60 mg daily.  Continue lorazepam 2 mg hs for anxiety.        VTE Assessment: Padua VTE Score: 4  VTE Prophylaxis Plan: Lovenox  Code Status: Full Code  Estimated Discharge Date: 5/22/2023  Disposition Planning: inpatient. Medsurg/ pending final culture results.     Chelsey Britton MD  5/21/2023   Encounter time 25 minutes; Face to Face Patient Counseling / Coordinating Care >50% of Encounter Time

## 2023-05-21 NOTE — ASSESSMENT & PLAN NOTE
Multiple puncture wounds due to dog bite with resultant significant erythema, swelling and induration affecting the area.   Concern for infection related to the dog bit and since it is affecting bilateral hands.  Hand orthopedic specialist consultation appreciated --> continue local wound care. No acute interventions indicated.     Has history of Pasturella 10 years ago from dog bite.   Dog which bit her has up to date rabies vaccine and patient had tdap booster 5 years ago

## 2023-05-21 NOTE — PROGRESS NOTES
Orthopaedic Progress Note    [S]   No acute events overnight. Denies any significant increase in pain    [O]  Vitals:    05/20/23 2047   BP:    Pulse:    Resp:    Temp:    SpO2: 97%       B/L UE  Erythema appears improving  Multiple puncture wound to dorsal aspect of bilateral hands  Sensation intact to light touch in axillary, median, ulnar, and radial nerve distributions  +radial pulse  +AIN/PIN/R/U      [A/P]   67 y.o. female status post bilateral dog bite to hands    - Local wound care  - Antibiotics per medicine, on unasyn  - Analgesia  - Will continue to monitor exam    Ariana Reyes, MD

## 2023-05-21 NOTE — CONSULTS
Orthopaedic Surgery Consult    CC: Bilateral puncture hand wounds secondary to dog bite    SUBJECTIVE   HPI: Rachael Louis is a 67 y.o. female RHD with no significant PMH presenting with bilateral small dog bite wounds to hands. Patient reports that she was feeding her dog chicken when he bit her right hand then she tried to wave if him and was bit on the left hand. She describes initially cleaning the wounds with water and betadine. She reports noting swelling with erythema and was prompted to present to the ED. Upon arrival to the ED patient was noted to have a temperature of 101.2 and was given tylenol, started on IV unasyn and most recently afebrile. Of note, patient reports having a dog bite to the right middle finger 10 years ago that was treated with IV antibiotics and ultimately irrigation and debridement.      Anticoagulation: None reported  Baseline ambulatory status: Community ambulator without assistive devices    PMH:  Past Medical History:   Diagnosis Date    Depression     Seasonal allergies        PSH:  Past Surgical History:   Procedure Laterality Date    CARPAL TUNNEL RELEASE Bilateral     HAND SURGERY      [place]ash[place]    NEPHRECTOMY         Meds:  No current facility-administered medications on file prior to encounter.     Current Outpatient Medications on File Prior to Encounter   Medication Sig Dispense Refill    FLUoxetine (PROzac) 20 mg capsule Take 20 mg by mouth 3 times daily.      LORazepam (ATIVAN) 1 mg tablet Take 1 mg by mouth 3 (three) times a day as needed for anxiety.      cholecalciferol, vitamin D3, 25 mcg (1,000 unit) capsule Take 1,000 Units by mouth daily.         Allergies:   Allergies   Allergen Reactions    Bactrim [Sulfamethoxazole-Trimethoprim] Hives       SH:     Social History     Socioeconomic History    Marital status:      Spouse name: Not on file    Number of children: Not on file    Years of education: Not on file    Highest education level: Not on  file   Occupational History    Not on file   Tobacco Use    Smoking status: Former     Packs/day: 0.75     Years: 36.00     Pack years: 27.00     Types: Cigarettes     Start date:      Quit date: 2010     Years since quittin.3     Passive exposure: Past    Smokeless tobacco: Never    Tobacco comments:     quit for about 5 yrs during span   Vaping Use    Vaping status: Not on file   Substance and Sexual Activity    Alcohol use: Not Currently    Drug use: Never    Sexual activity: Not on file   Other Topics Concern    Not on file   Social History Narrative    Retired hospice nurse.     Social Determinants of Health     Financial Resource Strain: Not on file   Food Insecurity: No Food Insecurity (2023)    Hunger Vital Sign     Worried About Running Out of Food in the Last Year: Never true     Ran Out of Food in the Last Year: Never true   Transportation Needs: Not on file   Physical Activity: Not on file   Stress: Not on file   Social Connections: Not on file   Intimate Partner Violence: Not on file   Housing Stability: Not on file           ROS:  CV: Denies CP or Palpitations.  Pulm: Denies SOB or Pain with inspiration      OBJECTIVE  Vitals:    23 2047   BP:    Pulse:    Resp:    Temp:    SpO2: 97%       CBC Results       23     1844    WBC 8.15    RBC 3.92    HGB 12.0    HCT 35.8    MCV 91.3    MCH 30.6    MCHC 33.5              Physical Exam:    General  No acute distress  AAOx3    RUE  Inspection: No gross deformity. Skin overall in tact, multiple small puncture wound in the dorsal aspect of the thenar compartment and overlying erythema (right worse than left)  Neurovascular: Palpable Radial Pulse. Sensation to light touch in Median, Ulnar, and Radial Distributions  Motor: Fires anterior interosseus nerve, posterior interosseus nerve, Radial nerve, Ulnar nerve, Hand intrinsics   Palpation:  Reports some discomfort to palpation in area around puncture wound. No apparent  areas of fluctuance noted.  ROM: Full Painless range of motion with flexion and extension of fingers and wrist    LUE  Inspection: No gross deformity. Skin overall in tact, multiple small puncture wound in the dorsal aspect over the thenar compartment and dorsal wrist and overlying erythema  Neurovascular: Palpable Radial Pulse. Sensation to light touch in Median, Ulnar, and Radial Distributions  Motor: Fires anterior interosseus nerve, posterior interosseus nerve, Radial nerve, Ulnar nerve, Hand intrinsics   Palpation:  Reports minimal discomfort to palpation in area over puncture wounds  ROM: Full Painless range of motion with flexion and extension of fingers        Imaging:  X-ray bilateral hand demonstrates no apparent fracture or dislocation      ASSESSMENT & PLAN   67 y.o. female  RHD with no significant PMH presenting with bilateral small dog bite wounds to hands.    -- Continue abx per medicine  -- Local wound care  -- Analgesia  -- Will continue to monitor exam      Ariana Reyes, MD

## 2023-05-21 NOTE — PLAN OF CARE
Plan of Care Review  Plan of Care Reviewed With: patient  Progress: improving  Outcome Summary: Pt having 4-5/10 pain in b/l hands from bites. relieved with Toradol. Hands red, warm amd swollen. Pt afebrile since arrival. Tolerating Unasyn q 6 hrs. Pt seen by surgery they will be monitoring swelling

## 2023-05-21 NOTE — ASSESSMENT & PLAN NOTE
Patient appears calm and cooperative on exam.   PDMP reviewed.     Plan:  - Continue fluoxetine 60 mg daily.  - Continue lorazepam 2 mg hs for anxiety.

## 2023-05-21 NOTE — PLAN OF CARE
Plan of Care Review  Plan of Care Reviewed With: patient  Progress: no change  Outcome Summary: Pt with mild pain and swelling to b/l hands today. PRN toradol given. Continue IV abx. Afebrile. Ortho following. Plan of care reviewed with pt.

## 2023-05-22 VITALS
WEIGHT: 171 LBS | SYSTOLIC BLOOD PRESSURE: 138 MMHG | RESPIRATION RATE: 18 BRPM | OXYGEN SATURATION: 97 % | TEMPERATURE: 98.2 F | BODY MASS INDEX: 31.47 KG/M2 | DIASTOLIC BLOOD PRESSURE: 64 MMHG | HEIGHT: 62 IN | HEART RATE: 64 BPM

## 2023-05-22 LAB
ALBUMIN SERPL-MCNC: 3.2 G/DL (ref 3.4–5)
ALP SERPL-CCNC: 94 IU/L (ref 35–126)
ALT SERPL-CCNC: 20 IU/L (ref 11–54)
ANION GAP SERPL CALC-SCNC: 10 MEQ/L (ref 3–15)
ANISOCYTOSIS BLD QL SMEAR: ABNORMAL
AST SERPL-CCNC: 21 IU/L (ref 15–41)
ATRIAL RATE: 63
BASOPHILS # BLD: 0.04 K/UL (ref 0.01–0.1)
BASOPHILS NFR BLD: 1.1 %
BILIRUB DIRECT SERPL-MCNC: 0.1 MG/DL
BILIRUB SERPL-MCNC: 0.6 MG/DL (ref 0.3–1.2)
BUN SERPL-MCNC: 29 MG/DL (ref 8–20)
CALCIUM SERPL-MCNC: 8.3 MG/DL (ref 8.9–10.3)
CHLORIDE SERPL-SCNC: 106 MEQ/L (ref 98–109)
CO2 SERPL-SCNC: 22 MEQ/L (ref 22–32)
CREAT SERPL-MCNC: 1 MG/DL (ref 0.6–1.1)
DIFFERENTIAL METHOD BLD: ABNORMAL
EOSINOPHIL # BLD: 0.16 K/UL (ref 0.04–0.36)
EOSINOPHIL NFR BLD: 4.4 %
ERYTHROCYTE [DISTWIDTH] IN BLOOD BY AUTOMATED COUNT: 14.1 % (ref 11.7–14.4)
EST. AVERAGE GLUCOSE BLD GHB EST-MCNC: 103 MG/DL
GFR SERPL CREATININE-BSD FRML MDRD: 55.3 ML/MIN/1.73M*2
GLUCOSE SERPL-MCNC: 100 MG/DL (ref 70–99)
HBA1C MFR BLD: 5.2 %
HCT VFR BLDCO AUTO: 35 % (ref 35–45)
HGB BLD-MCNC: 11.4 G/DL (ref 11.8–15.7)
IMM GRANULOCYTES # BLD AUTO: 0.01 K/UL (ref 0–0.08)
IMM GRANULOCYTES NFR BLD AUTO: 0.3 %
LYMPHOCYTES # BLD: 1.66 K/UL (ref 1.2–3.5)
LYMPHOCYTES NFR BLD: 45.9 %
MAGNESIUM SERPL-MCNC: 2 MG/DL (ref 1.8–2.5)
MCH RBC QN AUTO: 29.9 PG (ref 28–33.2)
MCHC RBC AUTO-ENTMCNC: 32.6 G/DL (ref 32.2–35.5)
MCV RBC AUTO: 91.9 FL (ref 83–98)
MONOCYTES # BLD: 0.56 K/UL (ref 0.28–0.8)
MONOCYTES NFR BLD: 15.5 %
NEUTROPHILS # BLD: 1.19 K/UL (ref 1.7–7)
NEUTS SEG NFR BLD: 32.8 %
NRBC BLD-RTO: 0 %
P AXIS: 42
PDW BLD AUTO: 12.6 FL (ref 9.4–12.3)
PLAT MORPH BLD: NORMAL
PLATELET # BLD AUTO: 132 K/UL (ref 150–369)
PLATELET # BLD EST: ABNORMAL 10*3/UL
POTASSIUM SERPL-SCNC: 4.8 MEQ/L (ref 3.6–5.1)
PR INTERVAL: 148
PROT SERPL-MCNC: 5.7 G/DL (ref 6–8.2)
QRS DURATION: 74
QT INTERVAL: 440
QTC CALCULATION(BAZETT): 450
R AXIS: 13
RBC # BLD AUTO: 3.81 M/UL (ref 3.93–5.22)
SODIUM SERPL-SCNC: 138 MEQ/L (ref 136–144)
T WAVE AXIS: 22
VENTRICULAR RATE: 63
WBC # BLD AUTO: 3.62 K/UL (ref 3.8–10.5)

## 2023-05-22 PROCEDURE — 80076 HEPATIC FUNCTION PANEL: CPT | Performed by: HOSPITALIST

## 2023-05-22 PROCEDURE — 63700000 HC SELF-ADMINISTRABLE DRUG: Performed by: HOSPITALIST

## 2023-05-22 PROCEDURE — 80048 BASIC METABOLIC PNL TOTAL CA: CPT | Performed by: HOSPITALIST

## 2023-05-22 PROCEDURE — 36415 COLL VENOUS BLD VENIPUNCTURE: CPT | Performed by: HOSPITALIST

## 2023-05-22 PROCEDURE — 96366 THER/PROPH/DIAG IV INF ADDON: CPT | Performed by: HOSPITALIST

## 2023-05-22 PROCEDURE — G0378 HOSPITAL OBSERVATION PER HR: HCPCS

## 2023-05-22 PROCEDURE — 63600000 HC DRUGS/DETAIL CODE: Performed by: HOSPITALIST

## 2023-05-22 PROCEDURE — 25800000 HC PHARMACY IV SOLUTIONS: Performed by: HOSPITALIST

## 2023-05-22 PROCEDURE — 83036 HEMOGLOBIN GLYCOSYLATED A1C: CPT | Performed by: HOSPITALIST

## 2023-05-22 PROCEDURE — 83735 ASSAY OF MAGNESIUM: CPT | Performed by: HOSPITALIST

## 2023-05-22 PROCEDURE — 85025 COMPLETE CBC W/AUTO DIFF WBC: CPT | Performed by: HOSPITALIST

## 2023-05-22 PROCEDURE — 99235 HOSP IP/OBS SAME DATE MOD 70: CPT | Performed by: HOSPITALIST

## 2023-05-22 RX ORDER — AMOXICILLIN AND CLAVULANATE POTASSIUM 875; 125 MG/1; MG/1
1 TABLET, FILM COATED ORAL 2 TIMES DAILY WITH MEALS
Status: DISCONTINUED | OUTPATIENT
Start: 2023-05-22 | End: 2023-05-22 | Stop reason: HOSPADM

## 2023-05-22 RX ORDER — AMOXICILLIN AND CLAVULANATE POTASSIUM 875; 125 MG/1; MG/1
1 TABLET, FILM COATED ORAL 2 TIMES DAILY WITH MEALS
Qty: 12 TABLET | Refills: 0 | Status: SHIPPED | OUTPATIENT
Start: 2023-05-22 | End: 2023-05-28

## 2023-05-22 RX ADMIN — AMPICILLIN SODIUM AND SULBACTAM SODIUM 3 G: 2; 1 INJECTION, POWDER, FOR SOLUTION INTRAMUSCULAR; INTRAVENOUS at 01:17

## 2023-05-22 RX ADMIN — AMPICILLIN SODIUM AND SULBACTAM SODIUM 3 G: 2; 1 INJECTION, POWDER, FOR SOLUTION INTRAMUSCULAR; INTRAVENOUS at 06:45

## 2023-05-22 RX ADMIN — AMPICILLIN SODIUM AND SULBACTAM SODIUM 3 G: 2; 1 INJECTION, POWDER, FOR SOLUTION INTRAMUSCULAR; INTRAVENOUS at 12:50

## 2023-05-22 RX ADMIN — FAMOTIDINE 20 MG: 20 TABLET, FILM COATED ORAL at 08:10

## 2023-05-22 RX ADMIN — Medication 1000 UNITS: at 08:10

## 2023-05-22 NOTE — PLAN OF CARE
Care Coordination Discharge Plan Summary   Date: 5/22/2023   Time: 11:15 AM    Patient Name: Rachael Louis  Medical Record Number: [id number]  YOB: 1955  Room/BED: G114    General Information  Patient-Specific Goals (Include Timeframe)     PCP: Primo Rodrigez MD   Insurance Coverage: IBC  Readmission Within the last 30 days  no previous admission in last 30 days    Advance Directives (for Healthcare)?  Does patient have advance directive?: No  Patient does not have Advance Directive: Patient/Family declines further information  Does patient have current OOH DNR form?: No  Patient does not have current OOH DNR form: Patient/Family declines further information  Does patient have current POLST?: No  Patient does not have current POLST: Patient/Family declines further information  Does patient have mental health advance directive?: No  Patient does not have Mental Health Advance Directive: Patient/Family declines further information        Information       Living Arrangements  Arrived From: home  Current Living Arrangements: home  People in Home: alone  Home Accessibility: stairs within home (Group)  Living Arrangement Comments: Pt's home is [address] and a 2nd floor set up.  Able to Return to Prior Arrangements: yes    Housing Stability and Financial Resources (SDOH)  In the last 12 months, was there a time when you were not able to pay the mortgage or rent on time?: No  In the last 12 months, how many places have you lived?: 1  In the last 12 months, was there a time when you did not have a steady place to sleep or slept in a shelter (including now)?: No  How hard is it for you to pay for the very basics like food, housing, medical care, and heating?: Not hard at all    Current Outpatient/Agency/Support Group       Type of Current Home Care Services       Assistive Device/Animal Currently Used at Home  none    Functional Status Comments  Pt is IND for all IADLs and ADLs.    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    Concerns to be Addressed  denies needs/concerns at this time, no discharge needs identified    Current Discharge Risk       Anticipated Changes Related to Illness  none    Transportation Concerns (SDOH)  Transportation Concerns: car, none  Transportation Anticipated: car, drives self  In the past 12 months, has lack of transportation kept you from medical appointments or from getting medications?: No  In the past 12 months, has lack of transportation kept you from meetings, work, or from getting things needed for daily living?: No    Concerns Comments       Anticipated Clinical Needs       Anticipated Discharge Plan   Anticipated Discharge Disposition: home without assistance or services  Patient/Family Anticipates Transition to: home  Patient/Family Anticipated Services at Transition: none  Met with patient. Provided education and contact information for Care Coordination services.: yes  Screening complete: yes    Discharge Assessment  Concerns to be Addressed: denies needs/concerns at this time, no discharge needs identified  Anticipated Changes Related to Illness: none    Concerns Comments:      Patient Choice            Discharge Transportation   Does the patient need discharge transport arranged?: No        Discharge Barriers   Comment: SW met w/ pt in room. Pt confirmed demographic info and pharmacy in the EMR. Pt stated that her dtr in EMR is her official POA. Pt is denying needs on d/c. SW went over pt's IMM rights.  Participants: occupational therapy, advanced practice provider, nursing, social work/services       DCP: home w/ no needs identified  IMM completed 5.22.23

## 2023-05-22 NOTE — PROGRESS NOTES
Hospital Medicine Service -  Daily Progress Note       SUBJECTIVE   Interval History: Patient seen and examined this morning. No acute events overnight. Patient denies chest pain, shortness of breath, dizziness and lightheadedness. She reports bilateral hand swelling and redness is slowly improving. She continues to have adequate ROM in bilateral hands. She remains afebrile and hemodynamically stable.    OBJECTIVE      Vital signs in last 24 hours:  Temp:  [36.7 °C (98 °F)-36.9 °C (98.4 °F)] 36.8 °C (98.2 °F)  Heart Rate:  [59-64] 64  Resp:  [18] 18  BP: (116-138)/(57-64) 138/64  No intake or output data in the 24 hours ending 05/22/23 1116    PHYSICAL EXAMINATION      Physical Exam  Vitals reviewed.   Constitutional:       General: She is not in acute distress.     Appearance: She is not diaphoretic.   Cardiovascular:      Rate and Rhythm: Normal rate and regular rhythm.      Pulses: Normal pulses.      Heart sounds: Normal heart sounds, S1 normal and S2 normal. No murmur heard.  Pulmonary:      Effort: Pulmonary effort is normal.      Breath sounds: Normal breath sounds. No wheezing.   Skin:     General: Skin is warm.      Comments: Left hand puncture wounds DEMETRIUS  Left wrist puncture wounds DEMETRIUS  Right hand puncture wounds DEMETRIUS  No drainage noted on wounds this morning.   Erythema remains localized to hands.    Neurological:      General: No focal deficit present.      Mental Status: She is alert and oriented to person, place, and time.            LINES, CATHETERS, DRAINS, AIRWAYS, AND WOUNDS   Lines, Drains, and Airways:  Wounds (agree with documentation and present on admission):  Peripheral IV (Adult) 05/20/23 Right Forearm (Active)   Number of days: 2     None    Comments:      LABS / IMAGING / TELE      Labs  Results from last 7 days   Lab Units 05/22/23  0605 05/21/23  0358   MAGNESIUM mg/dL 2.0 2.1      Results from last 7 days   Lab Units 05/22/23  0605 05/21/23  0358 05/20/23  1745   SODIUM mEQ/L 138 139  135*   POTASSIUM mEQ/L 4.8 4.2 5.0   CHLORIDE mEQ/L 106 110* 103   CO2 mEQ/L 22 22 22   BUN mg/dL 29* 23* 23*   CREATININE mg/dL 1.0 1.0 1.1   CALCIUM mg/dL 8.3* 8.3* 8.6*   ALBUMIN g/dL 3.2*  --  3.9   BILIRUBIN TOTAL mg/dL 0.6  --  1.0   ALK PHOS IU/L 94  --  108   ALT IU/L 20  --  20   AST IU/L 21  --  36   GLUCOSE mg/dL 100* 98 179*      Results from last 7 days   Lab Units 05/22/23  0605 05/21/23  0358 05/20/23  1844   WBC K/uL 3.62* 5.44 8.15   HEMOGLOBIN g/dL 11.4* 11.9 12.0   HEMATOCRIT % 35.0 35.5 35.8   PLATELETS K/uL 132* 155 171   DIFF TYPE  Auto Auto Auto   NRBC % 0.0 0.0 0.0   IMM GRANULOCYTES % 0.3 0.2 0.4   NEUTROPHILS % 32.8 58.8 83.7   LYMPHOCYTES % 45.9 26.5 6.1   MONOCYTES % 15.5 13.4 9.0   EOSINOPHILS % 4.4 0.4 0.2   BASOPHILS % 1.1 0.7 0.6   IMM GRANUCOCYTES ABS K/uL 0.01 0.01 0.03   MONO ABS AUTO K/uL 0.56 0.73 0.73   EOS ABS AUTO K/uL 0.16 0.02* 0.02*   BASO ABS AUTO K/uL 0.04 0.04 0.05       SARS-CoV-2 (COVID-19) (no units)   Date/Time Value   05/20/2023 1444 Negative       Imaging  I have independently reviewed the pertinent imaging from the last 24 hrs.    ECG/Telemetry  Patient is not on telemetry.    ASSESSMENT AND PLAN      * Dog bite, hand, unspecified laterality, initial encounter  Assessment & Plan  Multiple puncture wounds due to dog bite with resultant significant erythema, swelling and induration affecting the area.   Concern for infection related to the dog bit and since it is affecting bilateral hands.  Hand orthopedic specialist consultation appreciated --> continue local wound care. No acute interventions indicated.   Blood cultures: pending result    ROM in bilateral hands continues to remain adequate. No significant changes to neurovascular exam. She reports right hand pain is greater than left hand. No drainage noted in bilateral hands. She reports overall she feels the swelling has improved.     Plan:   - Continue IV Unasyn 3 g q8h while awaiting for blood cultures to  result.  - Apply ice to the affected area as tolerated.  - Tylenol 650 mg q4h PRN pain.  - IV Toradol 15 mg q6h PRN pain and inflammation.    - Continue to monitor vital signs, fevers  - Hand surgery consultation appreciated     Fever, unspecified fever cause  Assessment & Plan  Initially, Max T: 101.2 F on admission in the ED.   No further fevers since admission.   No evidence of leukocytosis on labs.  Blood cultures x 2: pending result  Concern for infection arising from bilateral hand wounds secondary to dog bite.       Plan-  - Continue Unasyn IV   - Follow blood culture results   - Continue to monitor vital signs, fevers.     Anxiety and depression  Assessment & Plan  Patient appears calm and cooperative on exam.   PDMP reviewed.     Plan:  - Continue fluoxetine 60 mg daily.  - Continue lorazepam 2 mg hs for anxiety.        VTE Assessment: Padua VTE Score: 4  VTE Prophylaxis:  Current anticoagulants:  enoxaparin (LOVENOX) syringe 40 mg, subcutaneous, Daily (6p)      Code Status: Full Code  Palliative Care Screening Score: 0   Estimated Discharge Date: 5/23/2023     Disposition Planning: Home when medically stable     LORAINE Garland  5/22/2023

## 2023-05-22 NOTE — ASSESSMENT & PLAN NOTE
Initially, Max T: 101.2 F on admission in the ED.   No further fevers since admission.   No evidence of leukocytosis on labs.  Blood cultures x 2: negative x 48 hours at the time of discharge   Patient clinically improved  Given Unasyn--> PO augmentin  Patient educated for s/s of worsening and when to seek care for fever

## 2023-05-22 NOTE — PLAN OF CARE
Problem: Adult Inpatient Plan of Care  Goal: Plan of Care Review  Outcome: Progressing  Flowsheets (Taken 5/22/2023 0614)  Progress: improving  Plan of Care Reviewed With: patient  Outcome Summary: VSS. Afebrile. B/L hand tenderness managed with tylenol. OOB independently. Awaiting blood culture results   Plan of Care Review  Plan of Care Reviewed With: patient  Progress: improving  Outcome Summary: VSS. Afebrile. B/L hand tenderness managed with tylenol. OOB independently. Awaiting blood culture results

## 2023-05-22 NOTE — DISCHARGE SUMMARY
[facility] Medicine Service -  Inpatient Discharge Summary        BRIEF OVERVIEW   Patient: Rachael Louis (1955)    Admitting Provider: Chelsey Britton MD  Attending Provider: Reggie Monahan MD Attending phys phone: [phone]    PCP: Primo Rodrigez [phone]    Admission Date: 5/20/2023  Discharge Date: 5/22/2023     DISCHARGE DIAGNOSES      Primary Discharge Diagnosis  Dog bite, hand, unspecified laterality, initial encounter    Secondary Discharge Diagnoses  Active Hospital Problems    Diagnosis Date Noted    Dog bite, hand, unspecified laterality, initial encounter 05/20/2023     Priority: High    Fever, unspecified fever cause 05/21/2023    Anxiety and depression 05/20/2023      Resolved Hospital Problems   No resolved problems to display.       Problem List on Day of Discharge  * Dog bite, hand, unspecified laterality, initial encounter  Assessment & Plan  Multiple puncture wounds due to dog bite with resultant significant erythema, swelling and induration affecting the area.   Concern for infection related to the dog bit and since it is affecting bilateral hands.  Hand orthopedic specialist consultation appreciated --> continue local wound care. No acute interventions indicated.   Blood cultures: pending result    ROM in bilateral hands continues to remain adequate. No significant changes to neurovascular exam. She reports right hand pain is greater than left hand. No drainage noted in bilateral hands. She reports overall she feels the swelling has improved.     Plan:   - Continue IV Unasyn 3 g q8h while awaiting for blood cultures to result.  - Apply ice to the affected area as tolerated.  - Tylenol 650 mg q4h PRN pain.  - IV Toradol 15 mg q6h PRN pain and inflammation.    - Continue to monitor vital signs, fevers  - Hand surgery consultation appreciated     Fever, unspecified fever cause  Assessment & Plan  Initially, Max T: 101.2 F on admission in the ED.   No further fevers since  "admission.   No evidence of leukocytosis on labs.  Blood cultures x 2: pending result  Concern for infection arising from bilateral hand wounds secondary to dog bite.       Plan-  - Continue Unasyn IV   - Follow blood culture results   - Continue to monitor vital signs, fevers.     Anxiety and depression  Assessment & Plan  Patient appears calm and cooperative on exam.   PDMP reviewed.     Plan:  - Continue fluoxetine 60 mg daily.  - Continue lorazepam 2 mg hs for anxiety.     SUMMARY OF HOSPITALIZATION      Presenting Problem/History of Present Illness  This is a 67 y.o. year-old female admitted on 5/20/2023 with Cellulitis of hand [L03.119]  Dog bite, hand, unspecified laterality, initial encounter [S61.459A, W54.0XXA]  Fever, unspecified fever cause [R50.9]  Dog bite, initial encounter [W54.0XXA].    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 Patient reports that her dog has not been feeling well and this morning she was helping him eat a chicken bone when he suddenly became upset and started to \"bite at her\". She states that he initially bit her left hand and when she tried to use her right hand to push him away from her he then proceeded to bite her right hand. She states that this occurred at 9 AM this morning and that she sustained several puncture wounds affecting bilateral hands and the left wrist. She states that she cleaned the wounds out with water and betadine to limit the possibility of infection. As the day progressed she states that she started to have swelling and erythema localized around the puncture wounds, however, she noticed that by the afternoon that the areas of swelling and tenderness became worse. She states that her pain is localized to the areas of the puncture wounds and when she " tries to make a fist with her hand. She denies any lightheadedness, dizziness, cough, sore throat, chest pain, palpitations, shortness of breath, pain with deep respirations, abdominal pain, dysuria, diarrhea, constipation, nausea or vomiting. On arrival to the ER the patient was noted to be febrile with a temperature of 101.2. She has been given a dose of IV Unasyn 3 grams in the ER. Patient reports that she had a dog bite approximately 10 years ago that was not controlled and required her to have to go to the OR for washout and required IV antibiotics.     Hospital Course    The patient was admitted to the observation telemetry unit for further evaluation. The patient was seen in consultation of the orthopedic team during hospitalization.      The patient was initially seen in consultation of the orthopedic team for further evaluation of bilateral hand wounds. Upon initial evaluation, the hand surgeon orthopedic specialist recommended IV Unasyn q6h while inpatient and monitoring response.     On the day of discharge, the patient denies chest pain, shortness of breath, dizziness, and lightheadedness. Neurovascular exams were monitored frequently while inpatient. She is tolerating a regular diet without difficulty. The patient remains afebrile and hemodynamically stable. The patient was instructed to follow up with their primary care physician within a week of discharge from the hospital.    Exam on Day of Discharge  Physical Exam  Vitals reviewed.   Constitutional:       General: She is not in acute distress.     Appearance: She is not diaphoretic.   Cardiovascular:      Rate and Rhythm: Normal rate and regular rhythm.      Pulses: Normal pulses.      Heart sounds: Normal heart sounds, S1 normal and S2 normal. No murmur heard.  Pulmonary:      Effort: Pulmonary effort is normal.      Breath sounds: No wheezing.   Skin:     General: Skin is warm.      Comments: L hand puncture wounds DEMETRIUS  L wrist puncture wounds  DEMETRIUS  R hand puncture wounds DEMETRIUS  No drainage noted    Neurological:      General: No focal deficit present.      Mental Status: She is alert and oriented to person, place, and time.         Consults During Admission  IP CONSULT TO HAND SURGERY    DISCHARGE MEDICATIONS               Medication List      ASK your doctor about these medications    cholecalciferol (vitamin D3) 25 mcg (1,000 unit) capsule  Take 1,000 Units by mouth daily.  Dose: 1,000 Units     FLUoxetine 20 mg capsule  Commonly known as: PROzac  Take 20 mg by mouth 3 times daily.  Dose: 20 mg     LORazepam 1 mg tablet  Commonly known as: ATIVAN  Take 1 mg by mouth 3 (three) times a day as needed for anxiety.  Dose: 1 mg             {Discharge non-med orders (select AFTER ordering discharge orders):76410}       PROCEDURES / LABS / IMAGING      Operative Procedures  none    Other Procedures  none    Pertinent Labs  Results from last 7 days   Lab Units 05/22/23  0605 05/21/23  0358 05/20/23  1745   SODIUM mEQ/L 138 139 135*   POTASSIUM mEQ/L 4.8 4.2 5.0   CHLORIDE mEQ/L 106 110* 103   CO2 mEQ/L 22 22 22   BUN mg/dL 29* 23* 23*   CREATININE mg/dL 1.0 1.0 1.1   CALCIUM mg/dL 8.3* 8.3* 8.6*   ALBUMIN g/dL 3.2*  --  3.9   BILIRUBIN TOTAL mg/dL 0.6  --  1.0   ALK PHOS IU/L 94  --  108   ALT IU/L 20  --  20   AST IU/L 21  --  36   GLUCOSE mg/dL 100* 98 179*      Results from last 7 days   Lab Units 05/22/23  0605 05/21/23  0358 05/20/23  1844   WBC K/uL 3.62* 5.44 8.15   HEMOGLOBIN g/dL 11.4* 11.9 12.0   HEMATOCRIT % 35.0 35.5 35.8   PLATELETS K/uL 132* 155 171   DIFF TYPE  Auto Auto Auto   NRBC % 0.0 0.0 0.0   IMM GRANULOCYTES % 0.3 0.2 0.4   NEUTROPHILS % 32.8 58.8 83.7   LYMPHOCYTES % 45.9 26.5 6.1   MONOCYTES % 15.5 13.4 9.0   EOSINOPHILS % 4.4 0.4 0.2   BASOPHILS % 1.1 0.7 0.6   IMM GRANUCOCYTES ABS K/uL 0.01 0.01 0.03   MONO ABS AUTO K/uL 0.56 0.73 0.73   EOS ABS AUTO K/uL 0.16 0.02* 0.02*   BASO ABS AUTO K/uL 0.04 0.04 0.05       SARS-CoV-2 (COVID-19) (no  units)   Date/Time Value   05/20/2023 1758 Negative       Pertinent Imaging  X-RAY HAND BILATERAL 3+VW    Result Date: 5/21/2023  IMPRESSION: No evidence for osteomyelitis or radiopaque foreign bodies in the soft tissues.    X-RAY CHEST 1 VIEW    Result Date: 5/20/2023  IMPRESSION: No acute disease in the chest.      OUTPATIENT  FOLLOW-UP / REFERRALS / PENDING TESTS        Outpatient Follow-Up Appointments  Encounter Information    This patient does not currently have any appointments scheduled.         Referrals  No orders of the defined types were placed in this encounter.      Test Results Pending at Discharge  Unresulted Labs (From admission, onward)    None          Important Issues to Address in Follow-Up  - Follow up with PCP in 1 week for hospital follow up appointment    DISCHARGE DISPOSITION AND DESTINATION      Disposition:    Destination:                              Code Status At Discharge: Full Code    Physician Order for Life-Sustaining Treatment Document Status      No documents found

## 2023-05-22 NOTE — DISCHARGE SUMMARY
[facility] Medicine Service -  Inpatient Discharge Summary        BRIEF OVERVIEW   Patient: Rachael Louis (1955)    Admitting Provider: Chelsey Britton MD  Attending Provider: Reggie Monahan MD Attending phys phone: [phone]    PCP: Primo Rodrigez [phone]    Admission Date: 5/20/2023  Discharge Date: 5/22/2023     DISCHARGE DIAGNOSES      Primary Discharge Diagnosis  Dog bite, hand, unspecified laterality, initial encounter    Secondary Discharge Diagnoses  Active Hospital Problems    Diagnosis Date Noted    Fever, unspecified fever cause 05/21/2023    Dog bite, hand, unspecified laterality, initial encounter 05/20/2023    Anxiety and depression 05/20/2023      Resolved Hospital Problems   No resolved problems to display.       Problem List on Day of Discharge  Fever, unspecified fever cause  Assessment & Plan  Initially, Max T: 101.2 F on admission in the ED.   No further fevers since admission.   No evidence of leukocytosis on labs.  Blood cultures x 2: negative x 48 hours at the time of discharge   Patient clinically improved  Given Unasyn--> PO augmentin  Patient educated for s/s of worsening and when to seek care for fever    Anxiety and depression  Assessment & Plan  Patient appears calm and cooperative on exam.   PDMP reviewed.     Plan:  - Continue fluoxetine 60 mg daily.  - Continue lorazepam 2 mg hs for anxiety.     * Dog bite, hand, unspecified laterality, initial encounter  Assessment & Plan  Multiple puncture wounds due to dog bite with resultant significant erythema, swelling and induration affecting the area.   Concern for infection related to the dog bit and since it is affecting bilateral hands.  Hand orthopedic specialist consultation appreciated --> continue local wound care. No acute interventions indicated.     Has history of Pasturella 10 years ago from dog bite.   Dog which bit her has up to date rabies vaccine and patient had tdap booster 5 years ago      SUMMARY OF  HOSPITALIZATION      Presenting Problem/History of Present Illness  This is a 67 y.o. year-old female admitted on 5/20/2023 with Cellulitis of hand [L03.119]  Dog bite, hand, unspecified laterality, initial encounter [S61.459A, W54.0XXA]  Fever, unspecified fever cause [R50.9]  Dog bite, initial encounter [W54.0XXA].      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 Later that day developed fever and chills. Has history of bite to finger by dog 10 years ago with + cultures for Pasturella.   Treated with Unasyn in hospital, seen by ortho. Improved with abx, no longer febrile, without leukocytosis, clinically improved.     Exam on Day of Discharge  Physical Exam   AAO3 NAD  RRR  CTA  Bilateral hand puncture wounds, minimal swelling and erythema    Consults During Admission  IP CONSULT TO HAND SURGERY    DISCHARGE MEDICATIONS               Medication List      START taking these medications    amoxicillin-pot clavulanate 875-125 mg per tablet  Commonly known as: AUGMENTIN  Take 1 tablet by mouth 2 (two) times a day with meals for 12 doses Indications: an infection of the skin and the tissue below the skin.  Dose: 1 tablet        CONTINUE taking these medications    cholecalciferol (vitamin D3) 25 mcg (1,000 unit) capsule  Take 1,000 Units by mouth daily.  Dose: 1,000 Units     FLUoxetine 20 mg capsule  Commonly known as: PROzac  Take 20 mg by mouth 3 times daily.  Dose: 20 mg     LORazepam 1 mg tablet  Commonly known as: ATIVAN  Take 1 mg by mouth 3 (three) times a day as needed for anxiety.  Dose: 1 mg                  PROCEDURES / LABS / IMAGING      Blood cultures x 48 h neg  Results from last 7 days   Lab Units 05/22/23  0605 05/21/23  0358 05/20/23  1844   WBC K/uL 3.62* 5.44 8.15   HEMOGLOBIN g/dL 11.4* 11.9 12.0   HEMATOCRIT % 35.0 35.5 35.8   PLATELETS K/uL 132* 155 171           SARS-CoV-2 (COVID-19) (no units)   Date/Time Value   05/20/2023 7538  Negative       Pertinent Imaging  X-RAY HAND BILATERAL 3+VW    Result Date: 5/21/2023  IMPRESSION: No evidence for osteomyelitis or radiopaque foreign bodies in the soft tissues.    X-RAY CHEST 1 VIEW    Result Date: 5/20/2023  IMPRESSION: No acute disease in the chest.      OUTPATIENT  FOLLOW-UP / REFERRALS / PENDING TESTS        Outpatient Follow-Up Appointments  Encounter Information    This patient does not currently have any appointments scheduled.         Referrals  No orders of the defined types were placed in this encounter.      Test Results Pending at Discharge  Unresulted Labs (From admission, onward)     Start     Ordered    05/22/23 1201  Hemoglobin A1c  Once        Question:  Release to patient  Answer:  Immediate    05/22/23 1201                Important Issues to Address in Follow-Up  Take oral antibiotics, follow up with PCP    DISCHARGE DISPOSITION AND DESTINATION      Disposition: Home   Destination:                              Code Status At Discharge: Full Code    Physician Order for Life-Sustaining Treatment Document Status      No documents found

## 2023-05-22 NOTE — DISCHARGE INSTRUCTIONS
You are being discharged home after being evaluated for a dog bite to both hands. You were seen in consultation with the hand surgeon.      Please follow up with your PCP in 1 week for hospital follow-up appointment.      Please  the antibiotics sent to your pharmacy when you leave the hospital and start the first dose in the evening with dinner. Take the antibiotics until they are finished even if you are feeling well.     Elevate your hands when you can to help reduce swelling.

## 2023-05-22 NOTE — PROGRESS NOTES
Orthopaedic Progress Note    [S]   AF/VSS. No acute events overnight. Reports subjective improvement in b/l hand swelling, pain, and erythema. No fevers, chills, myalgias, or arthralgias. Overall feels that her sx are much improved.     [O]  Vitals:    05/21/23 2215   BP: (!) 119/57   Pulse: (!) 59   Resp: 18   Temp: 36.9 °C (98.4 °F)   SpO2: 98%       B/L UE  Very mild erythema around wound, markedly improved   Multiple puncture wound to dorsal aspect of bilateral hands  Sensation intact to light touch in axillary, median, ulnar, and radial nerve distributions  +radial pulse  +AIN/PIN/R/U  ROM full and pain free       [A/P]   67 y.o. female status post bilateral dog bite to hands    - Local wound care  - Antibiotics per medicine, on unasyn  - Analgesia    Carlos Chávez MD

## 2023-05-26 LAB
BACTERIA BLD CULT: NORMAL
BACTERIA BLD CULT: NORMAL

## 2023-08-17 ENCOUNTER — HOSPITAL ENCOUNTER (OUTPATIENT)
Dept: RADIOLOGY | Age: 68
Discharge: HOME | End: 2023-08-17
Attending: INTERNAL MEDICINE
Payer: COMMERCIAL

## 2023-08-17 DIAGNOSIS — Z12.2 SCREENING FOR MALIGNANT NEOPLASM OF RESPIRATORY ORGAN: ICD-10-CM

## 2023-08-17 DIAGNOSIS — R91.1 COIN LESION: ICD-10-CM

## 2023-08-17 DIAGNOSIS — Z87.891 FORMER SMOKER: ICD-10-CM

## 2023-08-17 PROCEDURE — 71271 CT THORAX LUNG CANCER SCR C-: CPT

## 2023-09-21 ENCOUNTER — PATIENT OUTREACH (OUTPATIENT)
Dept: HEMATOLOGY/ONCOLOGY | Facility: HOSPITAL | Age: 68
End: 2023-09-21
Payer: COMMERCIAL

## 2023-09-21 NOTE — PATIENT INSTRUCTIONS
Appointment with pulmonologist:    [facility]  [address]  [place]  [address] [phone]    October 31, 2023 at 10:15 AM    Please bring completed new patient packet (will be emailed to you from [facility]) and insurance card/photo ID.

## 2024-03-22 ENCOUNTER — TRANSCRIBE ORDERS (OUTPATIENT)
Dept: SCHEDULING | Age: 69
End: 2024-03-22

## 2024-03-22 DIAGNOSIS — Z12.31 ENCOUNTER FOR SCREENING MAMMOGRAM FOR MALIGNANT NEOPLASM OF BREAST: Primary | ICD-10-CM

## 2024-03-27 ENCOUNTER — HOSPITAL ENCOUNTER (OUTPATIENT)
Dept: RADIOLOGY | Age: 69
Discharge: HOME | End: 2024-03-27
Attending: INTERNAL MEDICINE
Payer: COMMERCIAL

## 2024-03-27 DIAGNOSIS — Z12.31 ENCOUNTER FOR SCREENING MAMMOGRAM FOR MALIGNANT NEOPLASM OF BREAST: ICD-10-CM

## 2024-03-27 PROCEDURE — 77067 SCR MAMMO BI INCL CAD: CPT

## 2024-03-29 ENCOUNTER — HOSPITAL ENCOUNTER (OUTPATIENT)
Dept: RADIOLOGY | Age: 69
Discharge: HOME | End: 2024-03-29
Attending: RADIOLOGY
Payer: COMMERCIAL

## 2024-03-29 DIAGNOSIS — R92.8 ABNORMAL MAMMOGRAM: ICD-10-CM

## 2024-03-29 PROCEDURE — 77065 DX MAMMO INCL CAD UNI: CPT | Mod: RT

## 2024-06-26 ENCOUNTER — TRANSCRIBE ORDERS (OUTPATIENT)
Dept: SCHEDULING | Age: 69
End: 2024-06-26

## 2024-06-26 DIAGNOSIS — M54.50 LOW BACK PAIN: Primary | ICD-10-CM

## 2024-07-01 ENCOUNTER — HOSPITAL ENCOUNTER (OUTPATIENT)
Dept: RADIOLOGY | Age: 69
Discharge: HOME | End: 2024-07-01
Attending: INTERNAL MEDICINE
Payer: COMMERCIAL

## 2024-07-01 DIAGNOSIS — M54.50 LOW BACK PAIN: ICD-10-CM

## 2024-07-01 PROCEDURE — 72100 X-RAY EXAM L-S SPINE 2/3 VWS: CPT

## 2024-07-02 ENCOUNTER — TRANSCRIBE ORDERS (OUTPATIENT)
Dept: SCHEDULING | Age: 69
End: 2024-07-02

## 2024-07-02 DIAGNOSIS — M54.50 LOW BACK PAIN: Primary | ICD-10-CM

## 2024-07-02 DIAGNOSIS — M54.32 SCIATICA, LEFT SIDE: ICD-10-CM

## 2024-07-02 DIAGNOSIS — M48.07 SPINAL STENOSIS, LUMBOSACRAL REGION: ICD-10-CM

## 2024-07-11 ENCOUNTER — HOSPITAL ENCOUNTER (OUTPATIENT)
Dept: RADIOLOGY | Age: 69
Discharge: HOME | End: 2024-07-11
Attending: INTERNAL MEDICINE
Payer: COMMERCIAL

## 2024-07-11 DIAGNOSIS — M54.50 LOW BACK PAIN: ICD-10-CM

## 2024-07-11 DIAGNOSIS — M54.32 SCIATICA, LEFT SIDE: ICD-10-CM

## 2024-07-11 DIAGNOSIS — M48.07 SPINAL STENOSIS, LUMBOSACRAL REGION: ICD-10-CM

## 2024-10-22 ENCOUNTER — TELEPHONE (OUTPATIENT)
Dept: PULMONOLOGY | Facility: HOSPITAL | Age: 69
End: 2024-10-22
Payer: COMMERCIAL

## 2024-10-22 VITALS — HEIGHT: 62 IN | BODY MASS INDEX: 32.2 KG/M2 | WEIGHT: 175 LBS

## 2024-10-22 DIAGNOSIS — Z87.891 FORMER SMOKER: Primary | ICD-10-CM

## 2024-10-22 NOTE — TELEPHONE ENCOUNTER
Spoke with Rachael and she can schedule her lung screening once she has a precert.  She has the script.

## 2024-11-07 ENCOUNTER — HOSPITAL ENCOUNTER (OUTPATIENT)
Dept: RADIOLOGY | Age: 69
Discharge: HOME | End: 2024-11-07
Attending: INTERNAL MEDICINE
Payer: COMMERCIAL

## 2024-11-07 DIAGNOSIS — Z87.891 FORMER SMOKER: ICD-10-CM

## 2024-11-07 PROCEDURE — 71271 CT THORAX LUNG CANCER SCR C-: CPT

## 2025-03-20 ENCOUNTER — TRANSCRIBE ORDERS (OUTPATIENT)
Dept: SCHEDULING | Age: 70
End: 2025-03-20

## 2025-03-20 DIAGNOSIS — M86.9 SAPHO SYNDROME (CMS/HCC): ICD-10-CM

## 2025-03-20 DIAGNOSIS — L70.9 SAPHO SYNDROME (CMS/HCC): ICD-10-CM

## 2025-03-20 DIAGNOSIS — Z78.0 MENOPAUSE: ICD-10-CM

## 2025-03-20 DIAGNOSIS — L40.3 SAPHO SYNDROME (CMS/HCC): ICD-10-CM

## 2025-03-20 DIAGNOSIS — Z12.31 OTHER SCREENING MAMMOGRAM: Primary | ICD-10-CM

## 2025-03-20 DIAGNOSIS — M65.90 SAPHO SYNDROME (CMS/HCC): ICD-10-CM

## 2025-03-20 DIAGNOSIS — M85.80 SAPHO SYNDROME (CMS/HCC): ICD-10-CM

## 2025-05-06 ENCOUNTER — HOSPITAL ENCOUNTER (OUTPATIENT)
Dept: RADIOLOGY | Age: 70
Discharge: HOME | End: 2025-05-06
Attending: OBSTETRICS & GYNECOLOGY
Payer: COMMERCIAL

## 2025-05-06 DIAGNOSIS — L70.9 SAPHO SYNDROME (CMS/HCC): ICD-10-CM

## 2025-05-06 DIAGNOSIS — M85.80 SAPHO SYNDROME (CMS/HCC): ICD-10-CM

## 2025-05-06 DIAGNOSIS — Z78.0 MENOPAUSE: ICD-10-CM

## 2025-05-06 DIAGNOSIS — M65.90 SAPHO SYNDROME (CMS/HCC): ICD-10-CM

## 2025-05-06 DIAGNOSIS — M86.9 SAPHO SYNDROME (CMS/HCC): ICD-10-CM

## 2025-05-06 DIAGNOSIS — Z12.31 OTHER SCREENING MAMMOGRAM: ICD-10-CM

## 2025-05-06 DIAGNOSIS — L40.3 SAPHO SYNDROME (CMS/HCC): ICD-10-CM

## 2025-05-06 PROCEDURE — 77067 SCR MAMMO BI INCL CAD: CPT

## 2025-05-06 PROCEDURE — 77080 DXA BONE DENSITY AXIAL: CPT
